# Patient Record
Sex: FEMALE | Race: OTHER | URBAN - METROPOLITAN AREA
[De-identification: names, ages, dates, MRNs, and addresses within clinical notes are randomized per-mention and may not be internally consistent; named-entity substitution may affect disease eponyms.]

---

## 2017-05-10 ENCOUNTER — OFFICE VISIT (OUTPATIENT)
Dept: INTERNAL MEDICINE CLINIC | Age: 22
End: 2017-05-10

## 2017-05-10 VITALS
OXYGEN SATURATION: 99 % | WEIGHT: 168 LBS | SYSTOLIC BLOOD PRESSURE: 132 MMHG | HEART RATE: 99 BPM | BODY MASS INDEX: 26.71 KG/M2 | TEMPERATURE: 99 F | DIASTOLIC BLOOD PRESSURE: 84 MMHG

## 2017-05-10 DIAGNOSIS — G89.29 CHRONIC BILATERAL THORACIC BACK PAIN: ICD-10-CM

## 2017-05-10 DIAGNOSIS — Z11.4 SCREENING FOR HIV (HUMAN IMMUNODEFICIENCY VIRUS): ICD-10-CM

## 2017-05-10 DIAGNOSIS — R11.0 NAUSEA: ICD-10-CM

## 2017-05-10 DIAGNOSIS — R10.13 EPIGASTRIC PAIN: ICD-10-CM

## 2017-05-10 DIAGNOSIS — M54.6 CHRONIC BILATERAL THORACIC BACK PAIN: ICD-10-CM

## 2017-05-10 DIAGNOSIS — R10.9 FLANK PAIN: Primary | ICD-10-CM

## 2017-05-10 DIAGNOSIS — F90.2 ATTENTION DEFICIT HYPERACTIVITY DISORDER (ADHD), COMBINED TYPE: ICD-10-CM

## 2017-05-10 DIAGNOSIS — F41.9 ANXIETY: ICD-10-CM

## 2017-05-10 PROCEDURE — 81002 URINALYSIS NONAUTO W/O SCOPE: CPT | Performed by: NURSE PRACTITIONER

## 2017-05-10 PROCEDURE — 99214 OFFICE O/P EST MOD 30 MIN: CPT | Performed by: NURSE PRACTITIONER

## 2017-05-10 RX ORDER — OMEGA-3S/DHA/EPA/FISH OIL/D3 300MG-1000
400 CAPSULE ORAL 2 TIMES DAILY
COMMUNITY

## 2017-05-10 RX ORDER — ACETAMINOPHEN AND CODEINE PHOSPHATE 120; 12 MG/5ML; MG/5ML
1 SOLUTION ORAL DAILY
Qty: 30 TABLET | Refills: 0
Start: 2017-05-10

## 2017-05-10 RX ORDER — ACETAMINOPHEN AND CODEINE PHOSPHATE 120; 12 MG/5ML; MG/5ML
1 SOLUTION ORAL DAILY
COMMUNITY
End: 2017-05-10 | Stop reason: ALTCHOICE

## 2017-05-10 ASSESSMENT — ENCOUNTER SYMPTOMS
ABDOMINAL PAIN: 0
NAUSEA: 1
BACK PAIN: 1

## 2017-05-11 LAB
ANION GAP SERPL CALCULATED.3IONS-SCNC: 17 MMOL/L (ref 3–16)
BUN BLDV-MCNC: 11 MG/DL (ref 7–20)
CALCIUM SERPL-MCNC: 9.8 MG/DL (ref 8.3–10.6)
CHLORIDE BLD-SCNC: 99 MMOL/L (ref 99–110)
CO2: 24 MMOL/L (ref 21–32)
CREAT SERPL-MCNC: 0.6 MG/DL (ref 0.6–1.1)
GFR AFRICAN AMERICAN: >60
GFR NON-AFRICAN AMERICAN: >60
GLUCOSE BLD-MCNC: 86 MG/DL (ref 70–99)
POTASSIUM SERPL-SCNC: 3.6 MMOL/L (ref 3.5–5.1)
SODIUM BLD-SCNC: 140 MMOL/L (ref 136–145)

## 2017-05-12 LAB — HIV-1 AND HIV-2 ANTIBODIES: NEGATIVE

## 2023-09-19 PROBLEM — R78.81 BACTEREMIA ASSOCIATED WITH INTRAVASCULAR LINE (CMS-HCC): Status: ACTIVE | Noted: 2023-09-19

## 2023-09-19 PROBLEM — H35.419 LATTICE DEGENERATION: Status: ACTIVE | Noted: 2023-09-19

## 2023-09-19 PROBLEM — K52.9 GASTROENTERITIS: Status: ACTIVE | Noted: 2023-09-19

## 2023-09-19 PROBLEM — Z97.0 EYE GLOBE PROSTHESIS: Status: ACTIVE | Noted: 2023-09-19

## 2023-09-19 PROBLEM — E86.0 DEHYDRATION: Status: ACTIVE | Noted: 2023-09-19

## 2023-09-19 PROBLEM — R50.81 NEUTROPENIC FEVER (CMS-HCC): Status: ACTIVE | Noted: 2023-09-19

## 2023-09-19 PROBLEM — G43.909 MIGRAINE HEADACHE: Status: ACTIVE | Noted: 2023-09-19

## 2023-09-19 PROBLEM — N28.1 RENAL CYST, RIGHT: Status: ACTIVE | Noted: 2023-09-19

## 2023-09-19 PROBLEM — R07.9 CHEST PAIN: Status: ACTIVE | Noted: 2023-09-19

## 2023-09-19 PROBLEM — H33.329 RETINAL HOLE: Status: ACTIVE | Noted: 2023-09-19

## 2023-09-19 PROBLEM — C92.00 ACUTE MYELOID LEUKEMIA (MULTI): Status: ACTIVE | Noted: 2023-09-19

## 2023-09-19 PROBLEM — L81.4 OTHER MELANIN HYPERPIGMENTATION: Status: ACTIVE | Noted: 2021-10-28

## 2023-09-19 PROBLEM — Z20.822 SUSPECTED COVID-19 VIRUS INFECTION: Status: ACTIVE | Noted: 2023-09-19

## 2023-09-19 PROBLEM — B37.31 VULVOVAGINAL CANDIDIASIS: Status: ACTIVE | Noted: 2023-09-19

## 2023-09-19 PROBLEM — F41.9 ANXIETY: Status: ACTIVE | Noted: 2021-04-05

## 2023-09-19 PROBLEM — C92.90: Status: ACTIVE | Noted: 2023-09-19

## 2023-09-19 PROBLEM — G89.3 CANCER ASSOCIATED PAIN: Status: ACTIVE | Noted: 2023-09-19

## 2023-09-19 PROBLEM — F90.9 ATTENTION DEFICIT HYPERACTIVITY DISORDER (ADHD): Status: ACTIVE | Noted: 2021-04-26

## 2023-09-19 PROBLEM — D69.6 LOW PLATELET COUNT (CMS-HCC): Status: ACTIVE | Noted: 2023-09-19

## 2023-09-19 PROBLEM — R11.0 CHEMOTHERAPY-INDUCED NAUSEA: Status: ACTIVE | Noted: 2023-09-19

## 2023-09-19 PROBLEM — N76.0 VAGINITIS: Status: ACTIVE | Noted: 2023-09-19

## 2023-09-19 PROBLEM — F39 MOOD DISORDER (CMS-HCC): Status: ACTIVE | Noted: 2023-09-19

## 2023-09-19 PROBLEM — K58.9 IRRITABLE BOWEL SYNDROME: Status: ACTIVE | Noted: 2023-09-19

## 2023-09-19 PROBLEM — H44.521 PHTHISIS BULBI OF RIGHT EYE: Status: ACTIVE | Noted: 2023-09-19

## 2023-09-19 PROBLEM — G89.29 CHRONIC UPPER BACK PAIN: Status: ACTIVE | Noted: 2023-09-19

## 2023-09-19 PROBLEM — M54.50 CHRONIC LOW BACK PAIN: Status: ACTIVE | Noted: 2023-09-19

## 2023-09-19 PROBLEM — R10.13 ACUTE EPIGASTRIC PAIN: Status: ACTIVE | Noted: 2023-09-19

## 2023-09-19 PROBLEM — D70.9 NEUTROPENIC FEVER (CMS-HCC): Status: ACTIVE | Noted: 2023-09-19

## 2023-09-19 PROBLEM — J34.1 CYST OF MAXILLARY SINUS: Status: ACTIVE | Noted: 2023-09-19

## 2023-09-19 PROBLEM — T45.1X5A CHEMOTHERAPY-INDUCED NAUSEA: Status: ACTIVE | Noted: 2023-09-19

## 2023-09-19 PROBLEM — D22.5 MELANOCYTIC NEVI OF TRUNK: Status: ACTIVE | Noted: 2021-10-28

## 2023-09-19 PROBLEM — M79.18 MUSCULOSKELETAL PAIN: Status: ACTIVE | Noted: 2023-09-19

## 2023-09-19 PROBLEM — F32.81 PREMENSTRUAL DYSPHORIC SYNDROME: Status: ACTIVE | Noted: 2021-04-26

## 2023-09-19 PROBLEM — D64.9 ANEMIA: Status: ACTIVE | Noted: 2023-09-19

## 2023-09-19 PROBLEM — R63.5 ABNORMAL WEIGHT GAIN: Status: ACTIVE | Noted: 2023-09-19

## 2023-09-19 PROBLEM — L27.0 DRUG RASH: Status: ACTIVE | Noted: 2023-09-19

## 2023-09-19 PROBLEM — K21.9 ESOPHAGEAL REFLUX: Status: ACTIVE | Noted: 2023-09-19

## 2023-09-19 PROBLEM — R35.0 URINARY FREQUENCY: Status: ACTIVE | Noted: 2023-09-19

## 2023-09-19 PROBLEM — D84.9 IMMUNOCOMPROMISED STATE (MULTI): Status: ACTIVE | Noted: 2023-09-19

## 2023-09-19 PROBLEM — A04.72 C. DIFFICILE DIARRHEA: Status: ACTIVE | Noted: 2023-09-19

## 2023-09-19 PROBLEM — I10 HYPERTENSION: Status: ACTIVE | Noted: 2023-09-19

## 2023-09-19 PROBLEM — R30.0 DYSURIA: Status: ACTIVE | Noted: 2023-09-19

## 2023-09-19 PROBLEM — N39.0 URINARY TRACT INFECTION: Status: ACTIVE | Noted: 2023-09-19

## 2023-09-19 PROBLEM — R53.83 FATIGUE: Status: ACTIVE | Noted: 2023-09-19

## 2023-09-19 PROBLEM — T82.7XXA BACTEREMIA ASSOCIATED WITH INTRAVASCULAR LINE (CMS-HCC): Status: ACTIVE | Noted: 2023-09-19

## 2023-09-19 PROBLEM — M41.9 SCOLIOSIS: Status: ACTIVE | Noted: 2023-09-19

## 2023-09-19 PROBLEM — G89.29 CHRONIC LOW BACK PAIN: Status: ACTIVE | Noted: 2023-09-19

## 2023-09-19 PROBLEM — M54.9 CHRONIC UPPER BACK PAIN: Status: ACTIVE | Noted: 2023-09-19

## 2023-09-19 PROBLEM — R94.31 PROLONGED QT INTERVAL: Status: ACTIVE | Noted: 2023-09-19

## 2023-09-19 PROBLEM — N91.2 AMENORRHEA: Status: ACTIVE | Noted: 2023-09-19

## 2023-09-19 PROBLEM — G89.3 NEOPLASM RELATED PAIN: Status: ACTIVE | Noted: 2023-09-19

## 2023-09-19 PROBLEM — F43.12 CHRONIC POST-TRAUMATIC STRESS DISORDER (PTSD): Status: ACTIVE | Noted: 2021-04-26

## 2023-09-19 PROBLEM — L73.8 OTHER SPECIFIED FOLLICULAR DISORDERS: Status: ACTIVE | Noted: 2021-10-28

## 2023-09-19 PROBLEM — K12.31 MUCOSITIS DUE TO ANTINEOPLASTIC THERAPY: Status: ACTIVE | Noted: 2023-09-19

## 2023-09-19 PROBLEM — M54.6 THORACIC BACK PAIN: Status: ACTIVE | Noted: 2023-09-19

## 2023-09-19 PROBLEM — M62.838 MUSCLE SPASM: Status: ACTIVE | Noted: 2023-09-19

## 2023-09-19 PROBLEM — L82.1 OTHER SEBORRHEIC KERATOSIS: Status: ACTIVE | Noted: 2021-10-28

## 2023-09-19 PROBLEM — R94.2 ABNORMAL PFT: Status: ACTIVE | Noted: 2023-09-19

## 2023-09-19 PROBLEM — D61.818 PANCYTOPENIA, ACQUIRED (MULTI): Status: ACTIVE | Noted: 2023-09-19

## 2023-09-19 PROBLEM — N93.9 ABNORMAL UTERINE BLEEDING: Status: ACTIVE | Noted: 2023-09-19

## 2023-09-19 RX ORDER — VIT B COMP NO.3/FOLIC/C/BIOTIN 1 MG-60 MG
1 TABLET ORAL DAILY
COMMUNITY
End: 2024-03-11 | Stop reason: SDUPTHER

## 2023-09-19 RX ORDER — TRAZODONE HYDROCHLORIDE 100 MG/1
100 TABLET ORAL
COMMUNITY
Start: 2021-09-14

## 2023-09-19 RX ORDER — CAPSAICIN 0 G/G
CREAM TOPICAL
COMMUNITY
Start: 2022-11-10

## 2023-09-19 RX ORDER — NITROFURANTOIN 25; 75 MG/1; MG/1
1 CAPSULE ORAL EVERY 12 HOURS
COMMUNITY
Start: 2022-03-28 | End: 2024-04-16 | Stop reason: ALTCHOICE

## 2023-09-19 RX ORDER — NORETHINDRONE ACETATE AND ETHINYL ESTRADIOL 1MG-20(21)
1 KIT ORAL
COMMUNITY
Start: 2022-03-28 | End: 2024-04-16 | Stop reason: ALTCHOICE

## 2023-09-19 RX ORDER — DIPHENHYDRAMINE HCL 25 MG
25 TABLET ORAL EVERY 8 HOURS PRN
COMMUNITY
Start: 2021-06-28

## 2023-09-19 RX ORDER — CLINDAMYCIN PHOSPHATE 10 UG/ML
LOTION TOPICAL
COMMUNITY
Start: 2021-10-28 | End: 2024-04-16 | Stop reason: ALTCHOICE

## 2023-09-19 RX ORDER — TIZANIDINE 2 MG/1
2 TABLET ORAL AS NEEDED
COMMUNITY
Start: 2021-08-09

## 2023-09-19 RX ORDER — TIZANIDINE 4 MG/1
2 TABLET ORAL
COMMUNITY
Start: 2020-03-18 | End: 2024-03-11 | Stop reason: SDUPTHER

## 2023-09-19 RX ORDER — NORETHINDRONE ACETATE AND ETHINYL ESTRADIOL 1MG-20(21)
1 KIT ORAL DAILY
COMMUNITY
End: 2024-04-16 | Stop reason: ALTCHOICE

## 2023-09-19 RX ORDER — PROCHLORPERAZINE 25 MG/1
25 SUPPOSITORY RECTAL
COMMUNITY

## 2023-09-19 RX ORDER — METOPROLOL SUCCINATE 50 MG/1
1 TABLET, EXTENDED RELEASE ORAL DAILY
COMMUNITY
Start: 2021-01-07

## 2023-09-19 RX ORDER — LORAZEPAM 0.5 MG/1
0.5 TABLET ORAL EVERY 6 HOURS PRN
COMMUNITY
Start: 2022-11-10 | End: 2023-10-26 | Stop reason: SDUPTHER

## 2023-09-19 RX ORDER — AMITRIPTYLINE HYDROCHLORIDE 25 MG/1
25 TABLET, FILM COATED ORAL
COMMUNITY
Start: 2023-04-26

## 2023-09-19 RX ORDER — OMEPRAZOLE 40 MG/1
40 CAPSULE, DELAYED RELEASE ORAL
COMMUNITY
Start: 2021-10-13

## 2023-09-19 RX ORDER — FLUOXETINE HYDROCHLORIDE 20 MG/1
20 CAPSULE ORAL DAILY
COMMUNITY
Start: 2021-03-16

## 2023-09-19 RX ORDER — LISDEXAMFETAMINE DIMESYLATE 40 MG/1
CAPSULE ORAL
COMMUNITY
Start: 2015-01-05

## 2023-09-19 RX ORDER — LORATADINE 10 MG/1
1 CAPSULE, LIQUID FILLED ORAL DAILY
COMMUNITY
Start: 2021-08-19

## 2023-09-19 RX ORDER — GRANISETRON HYDROCHLORIDE 1 MG/1
1 TABLET, FILM COATED ORAL EVERY 12 HOURS PRN
COMMUNITY

## 2023-09-19 RX ORDER — FLUOXETINE HYDROCHLORIDE 40 MG/1
40 CAPSULE ORAL
COMMUNITY
Start: 2023-08-10

## 2023-09-19 RX ORDER — AZACITIDINE 100 MG/1
100 INJECTION, POWDER, LYOPHILIZED, FOR SOLUTION INTRAVENOUS; SUBCUTANEOUS
COMMUNITY
Start: 2022-02-17 | End: 2024-03-11 | Stop reason: ALTCHOICE

## 2023-09-19 RX ORDER — ACYCLOVIR 400 MG/1
400 TABLET ORAL 2 TIMES DAILY
COMMUNITY
Start: 2021-09-28

## 2023-09-19 RX ORDER — LORAZEPAM 1 MG/1
1 TABLET ORAL
COMMUNITY
End: 2024-03-14 | Stop reason: SDUPTHER

## 2023-09-19 RX ORDER — NYSTATIN 100000 U/G
OINTMENT TOPICAL
COMMUNITY
Start: 2021-03-17 | End: 2024-04-16 | Stop reason: ALTCHOICE

## 2023-09-19 RX ORDER — KETOCONAZOLE 20 MG/ML
SHAMPOO, SUSPENSION TOPICAL
COMMUNITY
Start: 2021-10-28

## 2023-10-19 ENCOUNTER — APPOINTMENT (OUTPATIENT)
Dept: PEDIATRIC HEMATOLOGY/ONCOLOGY | Facility: HOSPITAL | Age: 28
End: 2023-10-19

## 2023-10-26 DIAGNOSIS — C92.01 AML (ACUTE MYELOID LEUKEMIA) IN REMISSION (MULTI): Primary | ICD-10-CM

## 2023-10-26 RX ORDER — LORAZEPAM 0.5 MG/1
0.5 TABLET ORAL EVERY 6 HOURS PRN
Qty: 20 TABLET | Refills: 0 | Status: SHIPPED | OUTPATIENT
Start: 2023-10-26 | End: 2024-01-10 | Stop reason: SDUPTHER

## 2023-10-31 ENCOUNTER — HOSPITAL ENCOUNTER (EMERGENCY)
Facility: CLINIC | Age: 28
Discharge: HOME | End: 2023-10-31
Attending: EMERGENCY MEDICINE
Payer: COMMERCIAL

## 2023-10-31 VITALS
TEMPERATURE: 100 F | RESPIRATION RATE: 19 BRPM | DIASTOLIC BLOOD PRESSURE: 83 MMHG | OXYGEN SATURATION: 97 % | HEIGHT: 67 IN | SYSTOLIC BLOOD PRESSURE: 114 MMHG | HEART RATE: 102 BPM | WEIGHT: 210 LBS | BODY MASS INDEX: 32.96 KG/M2

## 2023-10-31 DIAGNOSIS — S61.112A LACERATION OF LEFT THUMB WITHOUT FOREIGN BODY WITH DAMAGE TO NAIL, INITIAL ENCOUNTER: Primary | ICD-10-CM

## 2023-10-31 PROCEDURE — 99282 EMERGENCY DEPT VISIT SF MDM: CPT

## 2023-10-31 RX ORDER — LIDOCAINE HYDROCHLORIDE AND EPINEPHRINE 10; 10 MG/ML; UG/ML
INJECTION, SOLUTION INFILTRATION; PERINEURAL
Status: DISCONTINUED
Start: 2023-10-31 | End: 2023-10-31 | Stop reason: WASHOUT

## 2023-11-01 NOTE — ED NOTES
Hemclot applied 2x2 secured with coban . Metal finger splint given      Ritu Koenig RN  10/31/23 2047

## 2023-11-01 NOTE — DISCHARGE INSTRUCTIONS
The laceration is sensitive in nature that if we try to sew it this would just destroy the tissue. This is going to have to heal by what is called secondary intention so this will heal from the inside out. We will go ahead and put a nice dressing on here we would like to have you had your family doctor see this in 1 to 2 days and change the dressing then he will probably have you change the dressing daily. We would like you to return back to emergency center if you have fevers chills redness or pus that develops. You may take Tylenol for any discomfort that you are having.

## 2023-11-01 NOTE — ED PROVIDER NOTES
edit the dictations but occasionally words are mis-transcribed.)    Jose Hillman MD  Attending Emergency Physician            Jose Hillman MD  10/31/23 2021

## 2023-11-09 ENCOUNTER — HOSPITAL ENCOUNTER (OUTPATIENT)
Dept: PEDIATRIC HEMATOLOGY/ONCOLOGY | Facility: HOSPITAL | Age: 28
Discharge: HOME | End: 2023-11-09
Payer: COMMERCIAL

## 2023-11-09 VITALS
HEIGHT: 67 IN | RESPIRATION RATE: 18 BRPM | SYSTOLIC BLOOD PRESSURE: 124 MMHG | DIASTOLIC BLOOD PRESSURE: 78 MMHG | BODY MASS INDEX: 33.88 KG/M2 | WEIGHT: 215.83 LBS | TEMPERATURE: 99.7 F | HEART RATE: 97 BPM

## 2023-11-09 DIAGNOSIS — C92.01 ACUTE MYELOID LEUKEMIA IN REMISSION (MULTI): ICD-10-CM

## 2023-11-09 DIAGNOSIS — C92.90: Primary | ICD-10-CM

## 2023-11-09 LAB
ALBUMIN SERPL BCP-MCNC: 4.7 G/DL (ref 3.4–5)
ALP SERPL-CCNC: 45 U/L (ref 33–110)
ALT SERPL W P-5'-P-CCNC: 19 U/L (ref 7–45)
ANION GAP SERPL CALC-SCNC: 15 MMOL/L (ref 10–20)
AST SERPL W P-5'-P-CCNC: 13 U/L (ref 9–39)
BASOPHILS # BLD AUTO: 0.03 X10*3/UL (ref 0–0.1)
BASOPHILS NFR BLD AUTO: 0.4 %
BILIRUB DIRECT SERPL-MCNC: 0 MG/DL (ref 0–0.3)
BILIRUB SERPL-MCNC: 0.3 MG/DL (ref 0–1.2)
BUN SERPL-MCNC: 13 MG/DL (ref 6–23)
CALCIUM SERPL-MCNC: 9.8 MG/DL (ref 8.6–10.6)
CHLORIDE SERPL-SCNC: 101 MMOL/L (ref 98–107)
CO2 SERPL-SCNC: 24 MMOL/L (ref 21–32)
CREAT SERPL-MCNC: 0.64 MG/DL (ref 0.5–1.05)
EOSINOPHIL # BLD AUTO: 0.19 X10*3/UL (ref 0–0.7)
EOSINOPHIL NFR BLD AUTO: 2.4 %
ERYTHROCYTE [DISTWIDTH] IN BLOOD BY AUTOMATED COUNT: 13.1 % (ref 11.5–14.5)
FERRITIN SERPL-MCNC: 1273 NG/ML (ref 8–150)
GFR SERPL CREATININE-BSD FRML MDRD: >90 ML/MIN/1.73M*2
GLUCOSE SERPL-MCNC: 99 MG/DL (ref 74–99)
HCT VFR BLD AUTO: 38.3 % (ref 36–46)
HGB BLD-MCNC: 12.4 G/DL (ref 12–16)
IMM GRANULOCYTES # BLD AUTO: 0.05 X10*3/UL (ref 0–0.7)
IMM GRANULOCYTES NFR BLD AUTO: 0.6 % (ref 0–0.9)
LYMPHOCYTES # BLD AUTO: 2.41 X10*3/UL (ref 1.2–4.8)
LYMPHOCYTES NFR BLD AUTO: 30.9 %
MCH RBC QN AUTO: 27.7 PG (ref 26–34)
MCHC RBC AUTO-ENTMCNC: 32.4 G/DL (ref 32–36)
MCV RBC AUTO: 86 FL (ref 80–100)
MONOCYTES # BLD AUTO: 0.3 X10*3/UL (ref 0.1–1)
MONOCYTES NFR BLD AUTO: 3.8 %
NEUTROPHILS # BLD AUTO: 4.82 X10*3/UL (ref 1.2–7.7)
NEUTROPHILS NFR BLD AUTO: 61.9 %
NRBC BLD-RTO: 0 /100 WBCS (ref 0–0)
PHOSPHATE SERPL-MCNC: 3.5 MG/DL (ref 2.5–4.9)
PLATELET # BLD AUTO: 229 X10*3/UL (ref 150–450)
POTASSIUM SERPL-SCNC: 4.5 MMOL/L (ref 3.5–5.3)
PROT SERPL-MCNC: 6.8 G/DL (ref 6.4–8.2)
RBC # BLD AUTO: 4.48 X10*6/UL (ref 4–5.2)
SODIUM SERPL-SCNC: 135 MMOL/L (ref 136–145)
WBC # BLD AUTO: 7.8 X10*3/UL (ref 4.4–11.3)

## 2023-11-09 PROCEDURE — 84100 ASSAY OF PHOSPHORUS: CPT | Performed by: PEDIATRICS

## 2023-11-09 PROCEDURE — 82248 BILIRUBIN DIRECT: CPT | Performed by: PEDIATRICS

## 2023-11-09 PROCEDURE — 85025 COMPLETE CBC W/AUTO DIFF WBC: CPT | Performed by: PEDIATRICS

## 2023-11-09 PROCEDURE — 80053 COMPREHEN METABOLIC PANEL: CPT | Performed by: PEDIATRICS

## 2023-11-09 PROCEDURE — 36415 COLL VENOUS BLD VENIPUNCTURE: CPT | Performed by: PEDIATRICS

## 2023-11-09 PROCEDURE — 82728 ASSAY OF FERRITIN: CPT | Performed by: PEDIATRICS

## 2023-11-09 ASSESSMENT — PAIN SCALES - GENERAL: PAINLEVEL: 0-NO PAIN

## 2023-11-09 ASSESSMENT — COLUMBIA-SUICIDE SEVERITY RATING SCALE - C-SSRS
2. HAVE YOU ACTUALLY HAD ANY THOUGHTS OF KILLING YOURSELF?: NO
6. HAVE YOU EVER DONE ANYTHING, STARTED TO DO ANYTHING, OR PREPARED TO DO ANYTHING TO END YOUR LIFE?: NO
1. IN THE PAST MONTH, HAVE YOU WISHED YOU WERE DEAD OR WISHED YOU COULD GO TO SLEEP AND NOT WAKE UP?: NO

## 2023-11-09 ASSESSMENT — ENCOUNTER SYMPTOMS
DEPRESSION: 0
LOSS OF SENSATION IN FEET: 0

## 2023-11-09 NOTE — PROGRESS NOTES
Patient ID: Jaylan Palmer is a 28 y.o. female.  Referring Physician: No referring provider defined for this encounter.  Primary Care Provider: Jazmine Howell MD    Date of Service:  11/9/2023    SUBJECTIVE:    History of Present Illness:  Jaylan is here for follow up today.  She is a 29 yo with a history of AML age 2y, diagnosed and treated here at [facility] per clinical trial [id number] (dauno, Cytarabine, etopo). She developed relapse v/s denovo AML at age 25. She is s/p induction chemotherapy with FLAG-Gema and EOI BMA/Bx showed morphological remission. She has completed 1 course of HiDoseAraC+gemutuzumab (one dose), and 2  courses of HiDose Tracy-C. She is currently on maintenance chemo with oral Azacitadine which she started on 10/17/21. The azacitadine has been dose reduced to 200 mg due to infections.    At [facility], she had a ferritin checked which was elevated.  She subsequently had a MRI (not contrast) that showed liver iron concentration of 3.4 mg Fe/g.  There was also a 5.4cmX5.6 cm lesion on her left lobe of her liver noted as well.  She stopped her OCP as this might be a benign lesion. She was also seen by hepatology.  She is not having any pain by her liver and her LFTs have been normal.     There has been some discussion of phlebotomy vs chelation.  At this point, it is being monitored. She has resumed menses very recently which will hopefully help to bring down the ferritin.     In [place], she sees specialists for neurology (this was for her chemo fog after treatment), ANNMARIE, cardiology, psychiatry, and will be seeing hepatology.   She has been busy.  She got  Oct 21 to her fiancee Jhoan.  She is living on [address] since Jhoan does med school there but working in [facility].  She has ongoing symptoms of fatigue, higher temps overall runs  at times shira while on aza, and has constipation this round (usually has diarrhea and more nausea). Has been taking daily miralax.  We discussed it can be twice a day and  could add colace and senna.  She has a PCP in [place].  We discussed talking with PCP if constipation doesn't improve.  Overall, she also discussed that since being on chemotherapy, her GI tract is not regular.  Additionally, she notes, it's been difficult to lose weight.  She has been eating healthy meals and has been active unpacking/moving into [place].  She is interested in walking more too. Most recent azacitadine started Monday 10/30. She also notes that in general, when she gets viral infections they are much more severe than prior to starting chemo.     She has had 2 longer breaks from the azacitadine due to a trip to [facility] and getting .      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 She was apparently well for nearly 22 years until November of 2020 when she started complaining of multiple vague symptoms including fatigue, headaches, fainting, dizziness, generalized body pain, tiredness, bruising over the arms, petechiae, low grade fevers and heavy menses. CBC showed thrombocytopenia and peripheral circulating blasts concerning for relapse. She subsequently moved here for further treatment    Her exam showed multiple bruises over thighs, legs, arms and petechiae. 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 Lab work showed: CBC 12.9>9.6/30.9<26  ANC 3350  ALC 3230 Blast %44    UA 3.8  RFP: 139/3.7/107/23/15/0.57 Peripheral smear: circulating blasts with Agueda rods consistent with relapsed AML    BMA/Bx done which showed hypercellular marrow involved by 57% blasts consistent with AML.   Cytogenetics, FISH - negative for CBFB (16q22 rearrangement), KMT2A MLL gene rearrangement, p53, RUNX1, 7q- and 5q-  and molecular studies are unremarkable - FLT3 ITD and FLT3 TKD  negative. 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 CSF- negative for blasts    Based on the above Jaylan did not have any of the high risk features. 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 We discussed her case at the time in both Adult and Pediatric tumor boards. She was offered to go on Phase III trial with uproleselan administered with chemo vs chemo alone in relapsed/refractory AML. However after careful consideration, Jaylan declined the trial  Subsequently proceeded with idarubicin-FLAG regimen.  She had an ECHO, which showed normal EF69% normal cardiac function.  She has prior cum anthracycline dose of 340 mg/m2.  Dose reduced cecy from 10mg/m2 to 8mg/m2 and administered zinecard. She received her first course chemotherapy and tolerated it fairly well. No significant tumor lysis. Cleared peripheral blasts early by day 3 of chemo. Fairly uncomplicated course except for Enterococcus Faecalis bacteremia and subsequent line removal.   End of induction BMA/ BX done on 4/23 showed hypocellular bone marrow (40% cellular) with maturing trilineage hematopoiesis, left shifted granulopoiesis and 3% myeloblasts. The overall findings suggest a regenerating marrow. The myeloblasts do not show immunophenotypic atypia seen in the patient's leukemic blasts (aberrant CD7 expression) .  Of note, after obtaining her NGS panel results we reviewed her previous reports her initial disease at age 2 was negative for CEBPA and it was tested as she was part of the COG trial. We performed a skin bx to assess for germline CEBPA and results are pending.  Her unrelated bone marrow donor search has shown a suitable match yet. Mom is age 65 and Dad is 78, mom 6/12 dad 8/12 match. One of her half sisters (Rosaura age 46y)  is 7/12 match. Jaylan declined HSCT after discussion with BMT team regarding TRM.  They had also seeked outside  "opinions which also supported chemotherapy rather than HSCT.    5/5/21 - 5/7/21: HD ARAC, Neupogen 5/9-5/18 6/2/21- admission for HD ARAC/ GO    7/19/21: Admit for Cycle 3 HD ARAC (last cycle of chemo), discharged home on 8/9.     10/15/21: started maintenance chemotherapy with oral Azacitidine          Acute myeloid leukemia (CMS/HCC)   9/19/2023 Initial Diagnosis    Acute myeloid leukemia (CMS/HCC)           Review of Systems   Constitutional:  Positive for fatigue and fever. Negative for appetite change.        Runs higher temps in general on aza, has fatigue, overall difficulty with weight loss due to chemo   HENT:   Negative for nosebleeds, sore throat and trouble swallowing.    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    Cardiovascular:  Negative for chest pain and palpitations.   Gastrointestinal:  Positive for abdominal distention and constipation.   Endocrine: Negative.    Genitourinary:  Negative for difficulty urinating, dysuria and hematuria.    Musculoskeletal: Negative.    Skin: Negative.    Neurological: Negative.         Some headaches but not severe and manageable   Hematological: Negative.         Does get swollen cervical nodes with aza.  They are actually decreasing in size right now   Psychiatric/Behavioral: Negative.          No new changes from baseline.  On medication for sleep and anxiety       OBJECTIVE:    VS:  /78 (BP Location: Right arm, Patient Position: Sitting, BP Cuff Size: Adult)   Pulse 97   Temp 37.6 °C (99.7 °F) (Oral)   Resp 18   Ht 1.691 m (5' 6.58\")   Wt 97.9 kg (215 lb 13.3 oz)   BMI 34.24 kg/m²   BSA: 2.14 meters squared  Pain: 0      Physical Exam  Constitutional:       Appearance: Normal appearance.   HENT:      Head: Normocephalic and atraumatic.      Right Ear: External ear normal.      Left Ear: External ear normal.      Nose: Nose normal.      " Mouth/Throat:      Mouth: Mucous membranes are moist.      Pharynx: Oropharynx is clear.   Eyes:      Conjunctiva/sclera: Conjunctivae normal.      Comments: Prosthesis right eye   Neck:      Comments: Cervical Lns are soft and mobile under 2 cm  Cardiovascular:      Rate and Rhythm: Normal rate and regular rhythm.      Heart sounds: Normal heart sounds.   Pulmonary:      Effort: Pulmonary effort is normal.      Breath sounds: Normal breath sounds.   Abdominal:      Palpations: Abdomen is soft.      Comments: Mild distension/bloating   Musculoskeletal:         General: Normal range of motion.      Cervical back: Normal range of motion and neck supple.   Lymphadenopathy:      Cervical: Cervical adenopathy present.   Skin:     General: Skin is warm and dry.   Neurological:      General: No focal deficit present.      Mental Status: She is alert and oriented to person, place, and time. Mental status is at baseline.   Psychiatric:         Mood and Affect: Mood normal.                Laboratory:  The pertinent laboratory results were reviewed and discussed with the patient.  Hospital Outpatient Visit on 11/09/2023   Component Date Value Ref Range Status    WBC 11/09/2023 7.8  4.4 - 11.3 x10*3/uL Final    nRBC 11/09/2023 0.0  0.0 - 0.0 /100 WBCs Final    RBC 11/09/2023 4.48  4.00 - 5.20 x10*6/uL Final    Hemoglobin 11/09/2023 12.4  12.0 - 16.0 g/dL Final    Hematocrit 11/09/2023 38.3  36.0 - 46.0 % Final    MCV 11/09/2023 86  80 - 100 fL Final    MCH 11/09/2023 27.7  26.0 - 34.0 pg Final    MCHC 11/09/2023 32.4  32.0 - 36.0 g/dL Final    RDW 11/09/2023 13.1  11.5 - 14.5 % Final    Platelets 11/09/2023 229  150 - 450 x10*3/uL Final    Neutrophils % 11/09/2023 61.9  40.0 - 80.0 % Final    Immature Granulocytes %, Automated 11/09/2023 0.6  0.0 - 0.9 % Final    Immature Granulocyte Count (IG) includes promyelocytes, myelocytes and metamyelocytes but does not include bands. Percent differential counts (%) should be interpreted  in the context of the absolute cell counts (cells/UL).    Lymphocytes % 11/09/2023 30.9  13.0 - 44.0 % Final    Monocytes % 11/09/2023 3.8  2.0 - 10.0 % Final    Eosinophils % 11/09/2023 2.4  0.0 - 6.0 % Final    Basophils % 11/09/2023 0.4  0.0 - 2.0 % Final    Neutrophils Absolute 11/09/2023 4.82  1.20 - 7.70 x10*3/uL Final    Percent differential counts (%) should be interpreted in the context of the absolute cell counts (cells/uL).    Immature Granulocytes Absolute, Au* 11/09/2023 0.05  0.00 - 0.70 x10*3/uL Final    Lymphocytes Absolute 11/09/2023 2.41  1.20 - 4.80 x10*3/uL Final    Monocytes Absolute 11/09/2023 0.30  0.10 - 1.00 x10*3/uL Final    Eosinophils Absolute 11/09/2023 0.19  0.00 - 0.70 x10*3/uL Final    Basophils Absolute 11/09/2023 0.03  0.00 - 0.10 x10*3/uL Final    Albumin 11/09/2023 4.7  3.4 - 5.0 g/dL Final    Bilirubin, Total 11/09/2023 0.3  0.0 - 1.2 mg/dL Final    Bilirubin, Direct 11/09/2023 0.0  0.0 - 0.3 mg/dL Final    Alkaline Phosphatase 11/09/2023 45  33 - 110 U/L Final    ALT 11/09/2023 19  7 - 45 U/L Final    Patients treated with Sulfasalazine may generate falsely decreased results for ALT.    AST 11/09/2023 13  9 - 39 U/L Final    Total Protein 11/09/2023 6.8  6.4 - 8.2 g/dL Final    Ferritin 11/09/2023 1,273 (H)  8 - 150 ng/mL Final    Glucose 11/09/2023 99  74 - 99 mg/dL Final    Sodium 11/09/2023 135 (L)  136 - 145 mmol/L Final    Potassium 11/09/2023 4.5  3.5 - 5.3 mmol/L Final    Chloride 11/09/2023 101  98 - 107 mmol/L Final    Bicarbonate 11/09/2023 24  21 - 32 mmol/L Final    Anion Gap 11/09/2023 15  10 - 20 mmol/L Final    Urea Nitrogen 11/09/2023 13  6 - 23 mg/dL Final    Creatinine 11/09/2023 0.64  0.50 - 1.05 mg/dL Final    eGFR 11/09/2023 >90  >60 mL/min/1.73m*2 Final    Calculations of estimated GFR are performed using the 2021 CKD-EPI Study Refit equation without the race variable for the IDMS-Traceable creatinine  methods.  https://jasn.asnjournals.org/content/early/2021/09/22/ASN.3243405724    Calcium 11/09/2023 9.8  8.6 - 10.6 mg/dL Final    Phosphorus 11/09/2023 3.5  2.5 - 4.9 mg/dL Final    The performance characteristics of phosphorus testing in heparinized plasma have been validated by the individual  laboratory site where testing is performed. Testing on heparinized plasma is not approved by the FDA; however, such approval is not necessary.          ASSESSMENT and PLAN:    Assessment:    Jaylan, 27 yo with PMH of AML at age 2,with history of denovo v/s relapsed AML with favorable cytogenetics (FLT3 ITD/TKD negative, biallelic CEBPA and GATA2 mutation). She is s/p cycle 1 of FLAG-HIRO (3/27), and bone marrow biopsy on 4/23 at the end of cycle 1 of chemotherapy shows morphological remission and no evidence of MRD based on flow. She is now s/p 3 cycles of consolidation chemo with HD ARAC (one cycle included one dose of gemtuxumab).  She is currently on maintenance chemotherapy with oral Azacitadine. Jaylan is here today with mom for a count check and follow up.    Jaylan is well appearing today. Oral Azacitadine chemotherapy continues to be managed by adult oncology.  She is managing nausea with  Kytril. ativan and compazine.  She has a normal cbc today. She has been worked up at  for elevated ferritin and mild increase LIC on liver MRI as well as new liver lesion seen on imaging not tender, LFTs normal. She is off of the OCPs due to the liver lesion.       Plan:    Oncology:   - 28 year old female with relapsed AML s/p cycle 1 FLAG-HIRO in March 2021 and x1 cycle of HD-ARAC and 1x HD Arac + GO. Bone marrow biopsy on 4/23 showed morpholoical remission and no evidence of MRD based on flow. Jaylan is now s/p 3 cycles of Consolidation chemo with HD ARAC (3gm/m2/dose).   - Did not undergo BMT given higher risk for morbidity due to underlying medical issues (please refer to note from Dr Rodríguez BMT physician)  -Jaylan had  Bone marrow bx and aspirate done on 8/13. Results of BMBX were negative for MRD  -Jaylan spoke with Dr. Baum in adult Heme/Onc re: vaccine trial. Not eligible for trial currently.  - On oral Azacitidine for maintenance chemotherapy.  Dose reduced to 200 mg.     Pulmonary:   Previously had reduced PFT results, currently arranging f/u appointment with Pulmonary Service. Repeat PFT scheduled for 10/4, met with Pulmonology. Improved DLCO currently considered in the normal range.    Cardiac:   - Follows with onco-cardiology at .  Continue metoprolol.    Neuro:  neuropathy-we prescribed capsaicin at list visit but she hasn't used it yet.   Has neurologist at   Headaches: - Chemotherapy induced headaches. Neurology has prescribed Amitriptyline 25mg every night. PRN Benadryl for migraine cocktail.   FENGI:  -Jaylan has a history of multiple GI complaints/ IBS. Peds GI recommended Dr. Narinder Zee/ Dr. Maicol Titus on the adult side, shared this with Jaylan. Is having constipation this cycle.  Will increase miralax and can also use senna/colace.    - Kytril working to control nausea, scripts sent for ativan and compazine today  -s/p  MRI of liver 7/13 and saw hepatology at   on 7/11. She will continue to follow with that team.          ID:  -No longer requires PCP ppx  -Immunized in fall 2023 for COVID booster and flu  -continue prophylactic acyclovir.     Reproductive Endo:  Was seen by gyn March 2022, seen in [place] on 8/22  -recent AMH in normal range at   -hasn't resumed regular menses yet  -using condoms for protection while on chemo  -Jaylan will talk to gyn considering copper IUD    Immune:    Saw immunology at         RTC: Will come back for follow up with us in 3 mos for exam, count check.      Jazmine Howell MD

## 2023-11-10 ASSESSMENT — ENCOUNTER SYMPTOMS
MUSCULOSKELETAL NEGATIVE: 1
HEMATURIA: 0
DYSURIA: 0
HEMATOLOGIC/LYMPHATIC NEGATIVE: 1
ENDOCRINE NEGATIVE: 1
ABDOMINAL DISTENTION: 1
FEVER: 1
WHEEZING: 0
APPETITE CHANGE: 0
FATIGUE: 1
TROUBLE SWALLOWING: 0
SORE THROAT: 0
CONSTIPATION: 1
COUGH: 0
PALPITATIONS: 0
DIFFICULTY URINATING: 0
NEUROLOGICAL NEGATIVE: 1
SHORTNESS OF BREATH: 0
PSYCHIATRIC NEGATIVE: 1

## 2024-01-10 DIAGNOSIS — C92.01 AML (ACUTE MYELOID LEUKEMIA) IN REMISSION (MULTI): ICD-10-CM

## 2024-01-10 RX ORDER — LORAZEPAM 0.5 MG/1
0.5 TABLET ORAL EVERY 6 HOURS PRN
Qty: 20 TABLET | Refills: 0 | Status: SHIPPED | OUTPATIENT
Start: 2024-01-10 | End: 2024-04-16 | Stop reason: SDUPTHER

## 2024-01-24 ENCOUNTER — E-VISIT (OUTPATIENT)
Dept: HEMATOLOGY/ONCOLOGY | Facility: HOSPITAL | Age: 29
End: 2024-01-24
Payer: COMMERCIAL

## 2024-01-31 NOTE — PROGRESS NOTES
Spoke to patient and explained that Dr. Baum is assigned as one of her providers. Pt states she will call the appointment line to schedule Patient verbalized understanding and agreement regarding discussed information via verbal feedback.

## 2024-01-31 NOTE — TELEPHONE ENCOUNTER
----- Message from Joanna Zhao sent at 1/31/2024 10:04 AM EST -----  Regarding: FW: also  Contact: [phone]    ----- Message -----  From: Jaylan Palmer  Sent: 1/31/2024   9:58 AM EST  To: [facility] Rn Care Coordinator  Subject: also                                             Hi I’m trying to book an appointment on March 6th or 8th but you do not pull up as my provider so it will not let me access scheduling requests.

## 2024-02-08 ENCOUNTER — NURSE TRIAGE (OUTPATIENT)
Dept: ADMISSION | Facility: HOSPITAL | Age: 29
End: 2024-02-08
Payer: COMMERCIAL

## 2024-02-08 NOTE — TELEPHONE ENCOUNTER
Call placed to patient and advised her to follow up with PCP., per JOSSELYN Oscar NP. Patient states she is expecting a call.   Advised patient to follow up with Dr. Baum regarding how she is feeling prior to the start of chemotherapy next week.   Patient is able to verbalize understanding.

## 2024-02-08 NOTE — TELEPHONE ENCOUNTER
Patient is calling in regarding ongoing jaw/ear pain and  fever. Patient reports she finished her 2nd round of antibiotics (Levaquin) yesterday.   Fever today is 100.8.   Patient denies SUTHERLAND, SOB, CP, weakness. Patient reports eating, drinking ,and urinating normally.   Patient reports recent episodes of diarrhea. Patient reports headache, right jaw and ear pain rated as a 2/10.   Patient has calls out to her PCP and Onco/PCP, but is concerned because she is due to start  chemotherapy on 02/12/24.   Patient states she is scheduled for a CT scan but not until 03/06/24.  Secure chat to the team.   Additional Information   Commented on: Are you having any of these problems?     Right side jaw and ear pain. Rates as 2/10   Commented on: Headache pain - on a scale of 0 - 10 (0 being no pain and 10 being the worst possible) how would you rate your pain?     Jaw and ear pain    Protocols used: Fever/Chills/Infection

## 2024-02-12 ENCOUNTER — TELEPHONE (OUTPATIENT)
Dept: ADMISSION | Facility: HOSPITAL | Age: 29
End: 2024-02-12
Payer: COMMERCIAL

## 2024-02-12 NOTE — TELEPHONE ENCOUNTER
Attempted to call pt - left  requesting return call for further details on ongoing symptoms.   Last FUV 2/1 with next planned FUV 3/11

## 2024-02-12 NOTE — TELEPHONE ENCOUNTER
----- Message from Joanna Zhao sent at 2/12/2024 11:08 AM EST -----  Regarding: FW: Fever/ Jaw   Contact: [phone]    ----- Message -----  From: Jaylan Palmer  Sent: 2/12/2024   9:56 AM EST  To: [facility] Rn Care Coordinator  Subject: Fever/ Jaw                                       Hi- an update I got my CT in [place] and am now back in [place]. My CT was clear except for my left sinus which they saw in 2020 (right before my cancer Dx) was full and thought was a mucus retention cyst. Here is message from my [facility] PCP “Per our phone conversation 2/10/23, I doubt the CT maxillary findings are related to current sxs, but have messaged your ENT here at , Dr. Prather, to get his opinion.    Most concerning thing is to help determine cause of persistent fevers, which I think are unrelated to your jaw pain (given dentist thinks it's your TMJ, the pain has improved and fevers have persisted, and CT was normal on that side).  It sounds like temp <100.4 on 2/10 and 2/11/24.  I would still rec repeating CBC w/diff and home covid swab (low liklihood but easy to rule out).  If fevers (Temp >100.4) return, we need to start thinking both outside the box (could this be a drug fever from aza, something autoimmune, something related to a clot) vs expanding our infectious work-up.  I would want to do this in coordiation with your PCP in [place] and oncology. Let me know what your temp is today and we can decide next steps”      Negative home Covid test. However, this morning I woke up with definite head congestion and running nose. Makes me wonder if my body has been fighting something off. Last night my fever finally hit 98.9 (I have not been this low in 3 weeks!). This morning I am at 99.8. I am going to go today for my follow up CBC and dif.

## 2024-02-14 NOTE — TELEPHONE ENCOUNTER
I spoke with pt this morning.   She said she is still having fevers of 100.5 for the past 2 days.   She is having ongoing cold symptoms- negative covid.   She is mostly concerned with CT results showing complete opacification of left sinus- which is similar to scan in 2020 when she was diagnosed with AML. Pt unsure if she needs biopsy of the sinus or what plan needs to be.   She is trying to manage PCP in [place] (who is oncology as well), pediatric oncologist she still follows with, Dr Baum,  and new oncologist in [place] - so all providers have same info and can discuss plan of care.   I advised that Dr. Baum will be reaching out later today- she is grateful and will await a call.

## 2024-03-05 ENCOUNTER — DOCUMENTATION (OUTPATIENT)
Dept: HEMATOLOGY/ONCOLOGY | Facility: HOSPITAL | Age: 29
End: 2024-03-05
Payer: COMMERCIAL

## 2024-03-05 DIAGNOSIS — R50.9 RECURRENT FEVER OF UNKNOWN CAUSE: Primary | ICD-10-CM

## 2024-03-05 NOTE — PROGRESS NOTES
Email exchange with patient via her [facility] email:    My reply:     So frustrating!  I'm sorry this is happening, and all the more difficult with all the transitions.       with the recurring fever, any new or worsening other symptoms?  If there are any new upper respiratory symptoms such cough, runny nose, or headache, I would recommend getting swabbed again for flu and covid (I suspect the others will agree with me - these two bugs have just been EVERYWHERE recently.)    I love the fact that with 's shiny new EMR, I can read Dr. Arango's documentation from the [facility].  I can see they tested: BLOOD cx x 2, UA+ cx, serum histoplasma antigen + histo antibodies, fungitell, galactomannan, strongyloides antibodies, RPP, C diff, CMV PCR and GI panel.  Unfortunately, I am unable to review the results - I suspect some is still pending?  I note that he is suspicious for a slow to clear viral infection. This is plausible, just wish we could identify which one.  When will you be in [place]?  If you are unable to secure an ENT appt in [place], I can try and reach out to our colleagues here and see if we can get one for when you are here, but with us not finding anything else, that left maxillary sinus is what I would guess is the next thing to investigate in detail.   Unfortunately, I think we have to continue to HOLD the azacitidine for now - we need this to resolve before we consider restarting.               From: Jaylan Palmer (fareed) <[email or web address]>  Sent: Tuesday, March 5, 2024 12:52:22 PM  To: Virginie Wood (noémj) <[email or web address]>; Jazmine Howell <[email or web address]>; Eron Baum <[email or web address]>  Subject: Encrypt: [place] Update     Hi all-   I just wanted to touch base and give some updates since I will be seeing Dr. Howell Thursday and Dr. Baum Monday.     I am switching primary care providers in [place] and my new provider  appointment is the 13th. I have still have the fever however yesterday it was around 101.6 all day (higher than has ever been) it could be a new infection or something else going on. However today has been back around 100.6.     So far all CT scans and Labs seem normal. I went to infectious disease and so far everything they are looking for has come back negative. I have copies of all results with me and in my chart. I have not been able to see ENT yet but am working to see if I can in [place] or call somewhere in [place]?  I have an ECHO scheduled 3/21.     Let me know if there is anything else you think I need to do for a work up while I am in [place].       Thank you!         Jaylan Palmer, PhD, MPH     Research      Survivorship and Supportive Services    [facility] [place]     Email: [email or web address]    Call/Text: [phone]

## 2024-03-07 ENCOUNTER — HOSPITAL ENCOUNTER (OUTPATIENT)
Dept: PEDIATRIC HEMATOLOGY/ONCOLOGY | Facility: HOSPITAL | Age: 29
Discharge: HOME | End: 2024-03-07
Payer: COMMERCIAL

## 2024-03-07 VITALS
BODY MASS INDEX: 34.86 KG/M2 | DIASTOLIC BLOOD PRESSURE: 89 MMHG | HEART RATE: 97 BPM | RESPIRATION RATE: 19 BRPM | TEMPERATURE: 99.6 F | SYSTOLIC BLOOD PRESSURE: 130 MMHG | WEIGHT: 216.93 LBS | HEIGHT: 66 IN

## 2024-03-07 DIAGNOSIS — R50.81 NEUTROPENIC FEVER (CMS-HCC): ICD-10-CM

## 2024-03-07 DIAGNOSIS — D70.9 NEUTROPENIC FEVER (CMS-HCC): ICD-10-CM

## 2024-03-07 DIAGNOSIS — R50.9 RECURRENT FEVER OF UNKNOWN CAUSE: Primary | ICD-10-CM

## 2024-03-07 DIAGNOSIS — C92.01 ACUTE MYELOID LEUKEMIA IN REMISSION (MULTI): ICD-10-CM

## 2024-03-07 DIAGNOSIS — D84.9 IMMUNOCOMPROMISED STATE (MULTI): ICD-10-CM

## 2024-03-07 DIAGNOSIS — R35.0 URINARY FREQUENCY: ICD-10-CM

## 2024-03-07 DIAGNOSIS — C92.90: Primary | ICD-10-CM

## 2024-03-07 DIAGNOSIS — C92.90: ICD-10-CM

## 2024-03-07 DIAGNOSIS — R50.9 FEVER OF UNKNOWN ORIGIN: ICD-10-CM

## 2024-03-07 LAB
ALBUMIN SERPL BCP-MCNC: 4.8 G/DL (ref 3.4–5)
ALP SERPL-CCNC: 40 U/L (ref 33–110)
ALT SERPL W P-5'-P-CCNC: 39 U/L (ref 7–45)
ANION GAP SERPL CALC-SCNC: 16 MMOL/L (ref 10–20)
APPEARANCE UR: CLEAR
AST SERPL W P-5'-P-CCNC: 20 U/L (ref 9–39)
BASOPHILS # BLD AUTO: 0.02 X10*3/UL (ref 0–0.1)
BASOPHILS NFR BLD AUTO: 0.3 %
BILIRUB DIRECT SERPL-MCNC: 0 MG/DL (ref 0–0.3)
BILIRUB SERPL-MCNC: 0.4 MG/DL (ref 0–1.2)
BILIRUB UR STRIP.AUTO-MCNC: NEGATIVE MG/DL
BUN SERPL-MCNC: 11 MG/DL (ref 6–23)
CALCIUM SERPL-MCNC: 10.3 MG/DL (ref 8.6–10.6)
CHLORIDE SERPL-SCNC: 104 MMOL/L (ref 98–107)
CO2 SERPL-SCNC: 21 MMOL/L (ref 21–32)
COLOR UR: NORMAL
CREAT SERPL-MCNC: 0.66 MG/DL (ref 0.5–1.05)
CRP SERPL-MCNC: 0.49 MG/DL
EGFRCR SERPLBLD CKD-EPI 2021: >90 ML/MIN/1.73M*2
EOSINOPHIL # BLD AUTO: 0.13 X10*3/UL (ref 0–0.7)
EOSINOPHIL NFR BLD AUTO: 1.6 %
ERYTHROCYTE [DISTWIDTH] IN BLOOD BY AUTOMATED COUNT: 13.2 % (ref 11.5–14.5)
ERYTHROCYTE [SEDIMENTATION RATE] IN BLOOD BY WESTERGREN METHOD: 22 MM/H (ref 0–20)
FERRITIN SERPL-MCNC: 1149 NG/ML (ref 8–150)
FLUAV RNA RESP QL NAA+PROBE: NOT DETECTED
FLUBV RNA RESP QL NAA+PROBE: NOT DETECTED
GLUCOSE SERPL-MCNC: 86 MG/DL (ref 74–99)
GLUCOSE UR STRIP.AUTO-MCNC: NORMAL MG/DL
HCT VFR BLD AUTO: 36.6 % (ref 36–46)
HGB BLD-MCNC: 12 G/DL (ref 12–16)
IGA SERPL-MCNC: 62 MG/DL (ref 70–400)
IGG SERPL-MCNC: 589 MG/DL (ref 700–1600)
IGM SERPL-MCNC: 42 MG/DL (ref 40–230)
IMM GRANULOCYTES # BLD AUTO: 0.05 X10*3/UL (ref 0–0.7)
IMM GRANULOCYTES NFR BLD AUTO: 0.6 % (ref 0–0.9)
KETONES UR STRIP.AUTO-MCNC: NEGATIVE MG/DL
LEUKOCYTE ESTERASE UR QL STRIP.AUTO: NEGATIVE
LYMPHOCYTES # BLD AUTO: 2.32 X10*3/UL (ref 1.2–4.8)
LYMPHOCYTES NFR BLD AUTO: 29.3 %
MCH RBC QN AUTO: 27.5 PG (ref 26–34)
MCHC RBC AUTO-ENTMCNC: 32.8 G/DL (ref 32–36)
MCV RBC AUTO: 84 FL (ref 80–100)
MONOCYTES # BLD AUTO: 0.4 X10*3/UL (ref 0.1–1)
MONOCYTES NFR BLD AUTO: 5 %
NEUTROPHILS # BLD AUTO: 5.01 X10*3/UL (ref 1.2–7.7)
NEUTROPHILS NFR BLD AUTO: 63.2 %
NITRITE UR QL STRIP.AUTO: NEGATIVE
NRBC BLD-RTO: 0 /100 WBCS (ref 0–0)
PH UR STRIP.AUTO: 6 [PH]
PHOSPHATE SERPL-MCNC: 3.8 MG/DL (ref 2.5–4.9)
PLATELET # BLD AUTO: 230 X10*3/UL (ref 150–450)
POTASSIUM SERPL-SCNC: 4.1 MMOL/L (ref 3.5–5.3)
PROT SERPL-MCNC: 7.2 G/DL (ref 6.4–8.2)
PROT UR STRIP.AUTO-MCNC: NEGATIVE MG/DL
RBC # BLD AUTO: 4.36 X10*6/UL (ref 4–5.2)
RBC # UR STRIP.AUTO: NEGATIVE /UL
S PYO DNA THROAT QL NAA+PROBE: NOT DETECTED
SARS-COV-2 RNA RESP QL NAA+PROBE: NOT DETECTED
SODIUM SERPL-SCNC: 137 MMOL/L (ref 136–145)
SP GR UR STRIP.AUTO: 1.01
UROBILINOGEN UR STRIP.AUTO-MCNC: NORMAL MG/DL
WBC # BLD AUTO: 7.9 X10*3/UL (ref 4.4–11.3)

## 2024-03-07 PROCEDURE — 36415 COLL VENOUS BLD VENIPUNCTURE: CPT | Performed by: PEDIATRICS

## 2024-03-07 PROCEDURE — 81003 URINALYSIS AUTO W/O SCOPE: CPT | Performed by: PEDIATRICS

## 2024-03-07 PROCEDURE — 86038 ANTINUCLEAR ANTIBODIES: CPT | Performed by: PEDIATRICS

## 2024-03-07 PROCEDURE — 85025 COMPLETE CBC W/AUTO DIFF WBC: CPT | Performed by: PEDIATRICS

## 2024-03-07 PROCEDURE — 86628 CANDIDA ANTIBODY: CPT | Performed by: PEDIATRICS

## 2024-03-07 PROCEDURE — 82728 ASSAY OF FERRITIN: CPT | Performed by: PEDIATRICS

## 2024-03-07 PROCEDURE — 82784 ASSAY IGA/IGD/IGG/IGM EACH: CPT | Performed by: PEDIATRICS

## 2024-03-07 PROCEDURE — 82248 BILIRUBIN DIRECT: CPT | Performed by: PEDIATRICS

## 2024-03-07 PROCEDURE — 87632 RESP VIRUS 6-11 TARGETS: CPT | Performed by: NURSE PRACTITIONER

## 2024-03-07 PROCEDURE — 99215 OFFICE O/P EST HI 40 MIN: CPT | Performed by: PEDIATRICS

## 2024-03-07 PROCEDURE — 87799 DETECT AGENT NOS DNA QUANT: CPT | Performed by: PEDIATRICS

## 2024-03-07 PROCEDURE — 87651 STREP A DNA AMP PROBE: CPT | Performed by: PEDIATRICS

## 2024-03-07 PROCEDURE — 86140 C-REACTIVE PROTEIN: CPT | Performed by: PEDIATRICS

## 2024-03-07 PROCEDURE — 84100 ASSAY OF PHOSPHORUS: CPT | Performed by: PEDIATRICS

## 2024-03-07 PROCEDURE — 85652 RBC SED RATE AUTOMATED: CPT | Performed by: PEDIATRICS

## 2024-03-07 PROCEDURE — 87636 SARSCOV2 & INF A&B AMP PRB: CPT | Mod: 59 | Performed by: NURSE PRACTITIONER

## 2024-03-07 PROCEDURE — 80053 COMPREHEN METABOLIC PANEL: CPT | Performed by: PEDIATRICS

## 2024-03-07 ASSESSMENT — PAIN SCALES - GENERAL: PAINLEVEL: 4

## 2024-03-07 ASSESSMENT — ENCOUNTER SYMPTOMS
NEUROLOGICAL NEGATIVE: 1
FATIGUE: 1
DEPRESSION: 0
TROUBLE SWALLOWING: 0
COUGH: 0
APPETITE CHANGE: 0
PALPITATIONS: 0
DYSURIA: 0
LOSS OF SENSATION IN FEET: 0
FEVER: 1
WHEEZING: 0
SHORTNESS OF BREATH: 0
SORE THROAT: 0
OCCASIONAL FEELINGS OF UNSTEADINESS: 0
ENDOCRINE NEGATIVE: 1
HEMATURIA: 0
PSYCHIATRIC NEGATIVE: 1
DIFFICULTY URINATING: 0
HEMATOLOGIC/LYMPHATIC NEGATIVE: 1

## 2024-03-07 NOTE — PROGRESS NOTES
Patient ID: Jaylan Palmer is a 28 y.o. female.  Referring Physician: No referring provider defined for this encounter.  Primary Care Provider: Jazmine Howell MD    Date of Service:  3/7/2024    SUBJECTIVE:    History of Present Illness:  Jaylan is here for follow up today.  She is a 29 yo with a history of AML age 2y, diagnosed and treated here at [facility] per clinical trial [id number] (dauno, Cytarabine, etopo). She developed relapse v/s denovo AML at age 25. She is s/p induction chemotherapy with FLAG-Gema and EOI BMA/Bx showed morphological remission. She has completed 1 course of HiDoseAraC+gemutuzumab (one dose), and 2  courses of HiDose Tracy-C. She is currently on maintenance chemo with oral Azacitadine which she started on 10/17/21. The azacitadine has been dose reduced to 200 mg due to infections.    However, around the later part of January 2024, she started having acute ride sided jaw/face pain/inflammation issues which she was on her azacitadine.  Azacitadine was held and has been held since then due to  persistent low grade fevers. She was seen by her PCP and dentist.  She had a CBC, ESR, Crp which was reassuring and was treated empirically with a 10 days course of augmentin by  her PCP in [place] without much improvement, followed by a 5 day course of levaquin-which she did have some improvement.  She has a history of TMJ for which she wears a mouth guard.   Dental also made some adjustments to her mouth guard and has also been taking  a muscle relaxer.  The jaw pain improved but she has had persistent temps 99s-101 range.    In this time she has also had URI symptoms due to a known exposure.  She had a neck CT with contrast in 2/9/23 which was essentially normal, apart from the same Left-sided maxillary sinus opacifacation as seen on prior scans in 2021.   Her [facility] PCP Dr Wood reached out to ENT in [facility] to get their thoughts. He felt her sxs were likely unrelated to these CT findings.  However her cyst  was larger than prior and so it's possible there's something going on with the sinsuses.  Jaylan waent back to [place] as that's where she is currently living with her  Jhoan.     During this time, it was recommended she see ENT in [place], keep a fever diary (Jaylan put together a diary which shows temps in the  range. Also had CT chest/abdomen/pelvis 2/21/24 on  which showed: no mediastinal, axillary lymphadenopathy.  Lungs clear, benign focal fatty infiltration of the liver adjacent to the falciform ligament, cholelithiasis without evidence for cholecystitis, no pancreatic mass, normal sized spleen, at least 4 nonobstructing stones in the right renal collecting system, largest is at the lower pole measures 0.4 cm.  Small right lower pole cysts.  No para-aortic adenopathy.  Normal bowel loops and appendix,.  1.8 cm right ovarian follicule.,  No pelvic or inguinal adenopathy.     She has also had other work up: CMP, HIV, YNES RF, monospot, CRP, all in a normal range  Normal CXR. She has had several CBCs with normal WBC, HB, no nrbcs or blasts, normal platelets, slight increase in immature granulocytes. Urine analysis did not show sign of infection. Also had aspergillus and histo testing, Quantiferon testing, monospot, pregnancy all negative. Ferritin improved at 949.     She has had some recent RUQ discomfort and off and on rib discomfort. Also feels like neck/mandible area is a little swollen.     She had a yeast infection in her  area and also around her umbilcal area.         Recent history: At [facility], she had a ferritin checked which was elevated.  She subsequently had a MRI (not contrast) that showed liver iron concentration of 3.4 mg Fe/g.  There was also a 7 cm esion on her left lobe of her liver noted as well.  She stopped her OCP as this might be a benign lesion. She was also seen by hepatology.  Thought to be focal nodular hyperplasia    There has been some discussion of phlebotomy vs chelation.   At this point, it is being monitored. She has resumed menses very recently which will hopefully help to bring down the ferritin.     In [place], she sees specialists for neurology (this was for her chemo fog after treatment), ANNMARIE, cardiology, psychiatry, and hepatology.     SH:  She got  Oct 21 to her fiancee Jhoan.  She is living on [address] since Jhoan does med school there but working in [employer] [facility].        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 She was apparently well for nearly 22 years until November of 2020 when she started complaining of multiple vague symptoms including fatigue, headaches, fainting, dizziness, generalized body pain, tiredness, bruising over the arms, petechiae, low grade fevers and heavy menses. CBC showed thrombocytopenia and peripheral circulating blasts concerning for relapse. She subsequently moved here for further treatment    Her exam showed multiple bruises over thighs, legs, arms and petechiae. 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 Lab work showed: CBC 12.9>9.6/30.9<26  ANC 3350  ALC 3230 Blast %44    UA 3.8  RFP: 139/3.7/107/23/15/0.57 Peripheral smear: circulating blasts with Agueda rods consistent with relapsed AML    BMA/Bx done which showed hypercellular marrow involved by 57% blasts consistent with AML.   Cytogenetics, FISH - negative for CBFB (16q22 rearrangement), KMT2A MLL gene rearrangement, p53, RUNX1, 7q- and 5q-  and molecular studies are unremarkable - FLT3 ITD and FLT3 TKD negative. 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 CSF- negative for blasts    Based on the above Jaylan did not have any of the high risk features. Given her disease recurred 22 years after her initial diagnosis, it was likely  very late relapse AML v/s denovo disease. We discussed her case at the time in both Adult and Pediatric tumor boards. She was offered to go on Phase III trial with uproleselan administered with chemo vs chemo alone in relapsed/refractory AML. However after careful consideration, Jaylan declined the trial  Subsequently proceeded with idarubicin-FLAG regimen.  She had an ECHO, which showed normal EF69% normal cardiac function.  She has prior cum anthracycline dose of 340 mg/m2.  Dose reduced cecy from 10mg/m2 to 8mg/m2 and administered zinecard. She received her first course chemotherapy and tolerated it fairly well. No significant tumor lysis. Cleared peripheral blasts early by day 3 of chemo. Fairly uncomplicated course except for Enterococcus Faecalis bacteremia and subsequent line removal.   End of induction BMA/ BX done on 4/23 showed hypocellular bone marrow (40% cellular) with maturing trilineage hematopoiesis, left shifted granulopoiesis and 3% myeloblasts. The overall findings suggest a regenerating marrow. The myeloblasts do not show immunophenotypic atypia seen in the patient's leukemic blasts (aberrant CD7 expression) .  Of note, after obtaining her NGS panel results we reviewed her previous reports her initial disease at age 2 was negative for CEBPA and it was tested as she was part of the [facility] trial. We performed a skin bx to assess for germline CEBPA and results are pending.  Her unrelated bone marrow donor search has shown a suitable match yet. Mom is age 65 and Dad is 78, mom 6/12 dad 8/12 match. One of her half sisters (Rosaura age 46y)  is 7/12 match. Jaylan declined HSCT after discussion with BMT team regarding TRM.  They had also seeked outside opinions which also supported chemotherapy rather than HSCT.    5/5/21 - 5/7/21: HD ARAC, Neupogen 5/9-5/18 6/2/21- admission for HD ARAC/ GO    7/19/21: Admit for Cycle 3 HD ARAC (last cycle of chemo), discharged home on 8/9.     10/15/21: started  "maintenance chemotherapy with oral Azacitidine          Acute myeloid leukemia (CMS/HCC)   9/19/2023 Initial Diagnosis    Acute myeloid leukemia (CMS/HCC)           Review of Systems   Constitutional:  Positive for fatigue and fever. Negative for appetite change.        Runs higher temps in general on aza, has fatigue, overall difficulty with weight loss due to chemo   HENT:   Negative for nosebleeds, sore throat and trouble swallowing.         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 A little drainage from right eye recently.    Respiratory:  Negative for cough, shortness of breath and wheezing.    Cardiovascular:  Negative for chest pain and palpitations.   Gastrointestinal:  Negative for abdominal distention and constipation.        Some RUQ discomfort   Endocrine: Negative.    Genitourinary:  Negative for difficulty urinating, dysuria and hematuria.    Musculoskeletal:         Some diffuse aches   Skin: Negative.    Neurological: Negative.         Some headaches but not severe and manageable   Hematological: Negative.         Some mildly swollen and tender cervical LNs   Psychiatric/Behavioral: Negative.          No new changes from baseline.  On medication for sleep and anxiety       OBJECTIVE:    VS:  /89 (BP Location: Right arm, Patient Position: Sitting, BP Cuff Size: Large adult long)   Pulse 97   Temp 37.6 °C (99.6 °F) (Oral)   Resp 19   Ht 1.689 m (5' 6.5\")   Wt 98.4 kg (216 lb 14.9 oz)   BMI 34.49 kg/m²   BSA: 2.15 meters squared      Physical Exam  Constitutional:       Appearance: Normal appearance.   HENT:      Head: Normocephalic and atraumatic.      Right Ear: External ear normal.      Left Ear: External ear normal.      Nose: Nose normal.      Mouth/Throat:      Mouth: Mucous membranes are moist.      Pharynx: Oropharynx is clear.   Eyes:      Conjunctiva/sclera: Conjunctivae normal.      Comments: Prosthesis right eye   Neck: "      Comments: Cervical Lns are soft and mobile under 2 cm, slightly tender  Cardiovascular:      Rate and Rhythm: Normal rate and regular rhythm.      Heart sounds: Normal heart sounds.   Pulmonary:      Effort: Pulmonary effort is normal.      Breath sounds: Normal breath sounds.   Abdominal:      Palpations: Abdomen is soft.      Comments: Mild distension/bloating   Genitourinary:     Comments: I was not able to palpate inguinal adenopathy  Musculoskeletal:         General: Normal range of motion.      Cervical back: Normal range of motion and neck supple.   Lymphadenopathy:      Cervical: Cervical adenopathy present.   Skin:     General: Skin is warm and dry.   Neurological:      General: No focal deficit present.      Mental Status: She is alert and oriented to person, place, and time. Mental status is at baseline.   Psychiatric:         Mood and Affect: Mood normal.                Laboratory:  The pertinent laboratory results were reviewed and discussed with the patient.  Hospital Outpatient Visit on 03/07/2024   Component Date Value Ref Range Status    Flu A Result 03/07/2024 Not Detected  Not Detected Final    Flu B Result 03/07/2024 Not Detected  Not Detected Final    Coronavirus 2019, PCR 03/07/2024 Not Detected  Not Detected Final    IgG 03/07/2024 589 (L)  700 - 1,600 mg/dL Final    IgA 03/07/2024 62 (L)  70 - 400 mg/dL Final    IgM 03/07/2024 42  40 - 230 mg/dL Final    Sedimentation Rate 03/07/2024 22 (H)  0 - 20 mm/h Final    Color, Urine 03/07/2024 Light-Yellow  Light-Yellow, Yellow, Dark-Yellow Final    Appearance, Urine 03/07/2024 Clear  Clear Final    Specific Gravity, Urine 03/07/2024 1.007  1.005 - 1.035 Final    pH, Urine 03/07/2024 6.0  5.0, 5.5, 6.0, 6.5, 7.0, 7.5, 8.0 Final    Protein, Urine 03/07/2024 NEGATIVE  NEGATIVE, 10 (TRACE), 20 (TRACE) mg/dL Final    Glucose, Urine 03/07/2024 Normal  Normal mg/dL Final    Blood, Urine 03/07/2024 NEGATIVE  NEGATIVE Final    Ketones, Urine 03/07/2024  NEGATIVE  NEGATIVE mg/dL Final    Bilirubin, Urine 03/07/2024 NEGATIVE  NEGATIVE Final    Urobilinogen, Urine 03/07/2024 Normal  Normal mg/dL Final    Nitrite, Urine 03/07/2024 NEGATIVE  NEGATIVE Final    Leukocyte Esterase, Urine 03/07/2024 NEGATIVE  NEGATIVE Final    Ferritin 03/07/2024 1,149 (H)  8 - 150 ng/mL Final    C-Reactive Protein 03/07/2024 0.49  <1.00 mg/dL Final    WBC 03/07/2024 7.9  4.4 - 11.3 x10*3/uL Final    nRBC 03/07/2024 0.0  0.0 - 0.0 /100 WBCs Final    RBC 03/07/2024 4.36  4.00 - 5.20 x10*6/uL Final    Hemoglobin 03/07/2024 12.0  12.0 - 16.0 g/dL Final    Hematocrit 03/07/2024 36.6  36.0 - 46.0 % Final    MCV 03/07/2024 84  80 - 100 fL Final    MCH 03/07/2024 27.5  26.0 - 34.0 pg Final    MCHC 03/07/2024 32.8  32.0 - 36.0 g/dL Final    RDW 03/07/2024 13.2  11.5 - 14.5 % Final    Platelets 03/07/2024 230  150 - 450 x10*3/uL Final    Neutrophils % 03/07/2024 63.2  40.0 - 80.0 % Final    Immature Granulocytes %, Automated 03/07/2024 0.6  0.0 - 0.9 % Final    Immature Granulocyte Count (IG) includes promyelocytes, myelocytes and metamyelocytes but does not include bands. Percent differential counts (%) should be interpreted in the context of the absolute cell counts (cells/UL).    Lymphocytes % 03/07/2024 29.3  13.0 - 44.0 % Final    Monocytes % 03/07/2024 5.0  2.0 - 10.0 % Final    Eosinophils % 03/07/2024 1.6  0.0 - 6.0 % Final    Basophils % 03/07/2024 0.3  0.0 - 2.0 % Final    Neutrophils Absolute 03/07/2024 5.01  1.20 - 7.70 x10*3/uL Final    Percent differential counts (%) should be interpreted in the context of the absolute cell counts (cells/uL).    Immature Granulocytes Absolute, Au* 03/07/2024 0.05  0.00 - 0.70 x10*3/uL Final    Lymphocytes Absolute 03/07/2024 2.32  1.20 - 4.80 x10*3/uL Final    Monocytes Absolute 03/07/2024 0.40  0.10 - 1.00 x10*3/uL Final    Eosinophils Absolute 03/07/2024 0.13  0.00 - 0.70 x10*3/uL Final    Basophils Absolute 03/07/2024 0.02  0.00 - 0.10 x10*3/uL  Final    Albumin 03/07/2024 4.8  3.4 - 5.0 g/dL Final    Bilirubin, Total 03/07/2024 0.4  0.0 - 1.2 mg/dL Final    Bilirubin, Direct 03/07/2024 0.0  0.0 - 0.3 mg/dL Final    Alkaline Phosphatase 03/07/2024 40  33 - 110 U/L Final    ALT 03/07/2024 39  7 - 45 U/L Final    Patients treated with Sulfasalazine may generate falsely decreased results for ALT.    AST 03/07/2024 20  9 - 39 U/L Final    Total Protein 03/07/2024 7.2  6.4 - 8.2 g/dL Final    Glucose 03/07/2024 86  74 - 99 mg/dL Final    Sodium 03/07/2024 137  136 - 145 mmol/L Final    Potassium 03/07/2024 4.1  3.5 - 5.3 mmol/L Final    Chloride 03/07/2024 104  98 - 107 mmol/L Final    Bicarbonate 03/07/2024 21  21 - 32 mmol/L Final    Anion Gap 03/07/2024 16  10 - 20 mmol/L Final    Urea Nitrogen 03/07/2024 11  6 - 23 mg/dL Final    Creatinine 03/07/2024 0.66  0.50 - 1.05 mg/dL Final    eGFR 03/07/2024 >90  >60 mL/min/1.73m*2 Final    Calculations of estimated GFR are performed using the 2021 CKD-EPI Study Refit equation without the race variable for the IDMS-Traceable creatinine methods.  https://jasn.asnjournals.org/content/early/2021/09/22/ASN.8148216716    Calcium 03/07/2024 10.3  8.6 - 10.6 mg/dL Final    Phosphorus 03/07/2024 3.8  2.5 - 4.9 mg/dL Final    The performance characteristics of phosphorus testing in heparinized plasma have been validated by the individual  laboratory site where testing is performed. Testing on heparinized plasma is not approved by the FDA; however, such approval is not necessary.    Group A Strep PCR 03/07/2024 Not Detected  Not Detected Final    This assay is an FDA-cleared, real-time PCR test for the qualitative detection of Group A Streptococcus bacterial DNA from throat swabs that have not undergone a nucleic acid extraction. Negative results do not require confirmation by culture.           ASSESSMENT and PLAN:    Assessment:    Emiliachris, 29 yo with PMH of AML at age 2,with history of denovo v/s relapsed AML with  favorable cytogenetics (FLT3 ITD/TKD negative, biallelic CEBPA and GATA2 mutation). She is s/p cycle 1 of FLAG-HIRO (3/27), and bone marrow biopsy on 4/23 at the end of cycle 1 of chemotherapy shows morphological remission and no evidence of MRD based on flow. She is now s/p 3 cycles of consolidation chemo with HD ARAC (one cycle included one dose of gemtuxumab).  She has been worked up at  for elevated ferritin and mild increase LIC on liver MRI as well as new liver lesion seen on imaging not tender, LFTs normal. She is off of the OCPs due to the liver lesion. She is currently on maintenance chemotherapy with oral Azacitadine managed by adult oncology which was held in October around the time of her wedding and also for almost 2 months due to persistent temps in the  range of unclear etiology.     Etiology of these fevers in not clear yet.  It could be due to her overall lowered immunity (IgG and IgA are mildly low) and she has had a few viral exposures.  She has taken home COVID tests which were negative.  Flu and COVID were negative today.  RAP panel from today is pending. Strep negative. CMV, aspergillus, histo, HIV, quantiferon, Cts Chest/abdomen/pelvis negative.   EBV PCR pending. Sinuses possible source of fever.  Less likely to have invasive fungal infection based on negative markers and CT exam also, less likely to occur on azacitadine.  Has been off of acyclovir recently for about 4 months but doesn't have signs or symptoms of HSV.    Malignancy can cause fevers but no obvious evidence based on scans, or inflammatory markers.  CBC with diff has been normal.  However, will obtain Bone marrow testing through adult oncology to rule out occult malignancy.    Autoimmune causes can trigger fever.,  YNES and RF were tested recently and were negative.     Review of her medications makes drug fever less likely. No new changes in medications recently.    Plan:    Oncology:   - 28 year old female with relapsed  AML s/p cycle 1 FLAG-HIRO in March 2021 and x1 cycle of HD-ARAC and 1x HD Arac + GO. Bone marrow biopsy on 4/23 showed morpholoical remission and no evidence of MRD based on flow. Jaylan is now s/p 3 cycles of Consolidation chemo with HD ARAC (3gm/m2/dose).   - Did not undergo BMT given higher risk for morbidity due to underlying medical issues (please refer to note from Dr Rodríguez BMT physician)  -Jaylan had Bone marrow bx and aspirate done on 8/13. Results of BMBX were negative for MRD  -Jaylan spoke with Dr. Baum in adult Heme/Onc re: vaccine trial. Not eligible for trial currently.  - On oral Azacitidine for maintenance chemotherapy.  Dose reduced to 200 mg. Currently on hold due to fevers.   -Will plan for Bone marrow through adult side next week   Pulmonary:   Previously had reduced PFT results, currently arranging f/u appointment with Pulmonary Service. Repeat PFT scheduled for 10/4, met with Pulmonology. Improved DLCO currently considered in the normal range.    Cardiac:   - Follows with onco-cardiology at .  Continue metoprolol.    Neuro:  neuropathy-we prescribed capsaicin at list visit but she hasn't used it yet.   Has neurologist at   Headaches: - Chemotherapy induced headaches. Neurology has prescribed Amitriptyline 25mg every night. PRN Benadryl for migraine cocktail.   FENGI:  -Jaylan has a history of multiple GI complaints/ IBS. Peds GI recommended Dr. Narinder Zee/ Dr. Maicol Titus on the adult side, shared this with Jaylan. Is having constipation this cycle.  Will increase miralax and can also use senna/colace.    - Kytril working to control nausea, scripts sent for ativan and compazine today  -s/p  MRI of liver 7/13 and saw hepatology at   on 7/11. She will continue to follow with that team.          ID:  -No longer requires PCP ppx  -Immunized in fall 2023 for COVID booster and flu  -Jaylan would like to talk to adult oncology to see if need to continue or if she can dose modify acyclovir.    Labs sent today: candida ab (pending) extended rap (pending) , covid/flu/strep-negative ebv pcr (pending), urine analysis normal today.  CRP was normal.  ESR mild elevation at 22.   -Rechecked YNES today and IgGAM.  Consider seeing immunology for lower IgGAM but may be due to treatment.  Possible benefit from IVIG?  -Would recommend ENT and ID consultations    Reproductive Endo:  Was seen by gyn March 2022, seen in [facility] on 8/22  -recent AMH in normal range at   -hasn't resumed regular menses yet  -using condoms for protection while on chemo  -Alique will talk to gyn considering copper IUD    Immune:    Saw immunology at  previously        RTC: Will come back for follow up with us in 3 mos for exam, count check. To see  Dr Baum adult oncology Monday 3/11      Jazmine Howell MD

## 2024-03-07 NOTE — PROGRESS NOTES
03/07/24 1334   Reason for Consult   Discipline Child Life Specialist   Total Time Spent (min) 30 minutes   Patient Intervention(s)   Type of Intervention Performed Healing environment interventions   Healing Environment Intervention(s) Expressive outlet;Normalization of environment;[place] of milestones   Support Provided to Family   Support Provided to Family Family present for patient session     Family and Child Life Services

## 2024-03-08 LAB
ANA SER QL HEP2 SUBST: NEGATIVE
EBV DNA SPEC NAA+PROBE-LOG#: NORMAL {LOG_COPIES}/ML
LABORATORY COMMENT REPORT: NOT DETECTED

## 2024-03-08 ASSESSMENT — ENCOUNTER SYMPTOMS
ABDOMINAL DISTENTION: 0
CONSTIPATION: 0

## 2024-03-11 ENCOUNTER — OFFICE VISIT (OUTPATIENT)
Dept: HEMATOLOGY/ONCOLOGY | Facility: HOSPITAL | Age: 29
End: 2024-03-11
Payer: COMMERCIAL

## 2024-03-11 VITALS
RESPIRATION RATE: 17 BRPM | SYSTOLIC BLOOD PRESSURE: 129 MMHG | TEMPERATURE: 98.4 F | HEART RATE: 117 BPM | DIASTOLIC BLOOD PRESSURE: 92 MMHG | OXYGEN SATURATION: 100 % | BODY MASS INDEX: 34.32 KG/M2 | WEIGHT: 215.83 LBS

## 2024-03-11 DIAGNOSIS — C92.01 ACUTE MYELOID LEUKEMIA IN REMISSION (MULTI): Primary | ICD-10-CM

## 2024-03-11 DIAGNOSIS — R50.9 FEVER OF UNKNOWN ORIGIN (FUO): ICD-10-CM

## 2024-03-11 DIAGNOSIS — D84.9 IMMUNOCOMPROMISED STATE (MULTI): ICD-10-CM

## 2024-03-11 LAB
ADENOVIRUS RVP, VIRC: NOT DETECTED
ENTEROVIRUS/RHINOVIRUS RVP, VIRC: NOT DETECTED
HUMAN BOCAVIRUS RVP, VIRC: NOT DETECTED
HUMAN CORONAVIRUS RVP, VIRC: NOT DETECTED
INFLUENZA A , VIRC: NOT DETECTED
INFLUENZA A H1N1-09 , VIRC: NOT DETECTED
INFLUENZA B PCR, VIRC: NOT DETECTED
METAPNEUMOVIRUS , VIRC: NOT DETECTED
PARAINFLUENZA PCR, VIRC: NOT DETECTED
RSV PCR, RVP, VIRC: NOT DETECTED

## 2024-03-11 PROCEDURE — 99214 OFFICE O/P EST MOD 30 MIN: CPT | Performed by: INTERNAL MEDICINE

## 2024-03-11 PROCEDURE — 1036F TOBACCO NON-USER: CPT | Performed by: INTERNAL MEDICINE

## 2024-03-11 PROCEDURE — 3080F DIAST BP >= 90 MM HG: CPT | Performed by: INTERNAL MEDICINE

## 2024-03-11 PROCEDURE — 3074F SYST BP LT 130 MM HG: CPT | Performed by: INTERNAL MEDICINE

## 2024-03-11 PROCEDURE — 99214 OFFICE O/P EST MOD 30 MIN: CPT | Mod: GC | Performed by: INTERNAL MEDICINE

## 2024-03-11 ASSESSMENT — ENCOUNTER SYMPTOMS
HEMATOLOGIC/LYMPHATIC NEGATIVE: 1
RESPIRATORY NEGATIVE: 1
PSYCHIATRIC NEGATIVE: 1
ENDOCRINE NEGATIVE: 1
EYES NEGATIVE: 1
FEVER: 1
CARDIOVASCULAR NEGATIVE: 1
NEUROLOGICAL NEGATIVE: 1
MUSCULOSKELETAL NEGATIVE: 1
GASTROINTESTINAL NEGATIVE: 1

## 2024-03-11 ASSESSMENT — PAIN SCALES - GENERAL: PAINLEVEL: 3

## 2024-03-11 NOTE — LETTER
March 12, 2024     Virginie Wood MD   [facility] [id number]    Patient: Jaylan Palmer   YOB: 1995   Date of Visit: 3/11/2024       Dear Dr. Virginie Wood MD:    I saw Ms. Jaylan Palmer yesterday for an evaluation. Below are my notes for this encounter.  On 3/12/2024, she will have a bone marrow biopsy to evaluate for causes of these low grade fevers. 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  I do not believe I will be able to obtain in less than 48 hours here in [place] and we were hopeful we might be able to arrange in [facility] - however, if not, we will work on finding an external site for this.     If you have questions, please do not hesitate to call me.       Sincerely,     Eron Baum MD      CC: MD Jazmine Arnold MD  ______________________________________________________________________________________    .Patient ID: Jaylan Palmer is a 28 y.o. female.  Referring Physician: No referring provider defined for this encounter.  Primary Care Provider: Jazmine Howell MD    Date of Service:  3/11/2024    ASSESSMENT and PLAN:      Problem List Items Addressed This Visit       Acute myeloid leukemia (CMS/HCC) - Primary    Immunocompromised state (CMS/HCC)    Fever of unknown origin (FUO)    Overview     Starting end of 1/2024 and through 3/2024 - recurring daily low grade fevers up to 101.6 at home peak in the afternoon.  Viral studies, cultures, fungal studies w/o clear cause; CT a/p with liver changes.   CT neck/face 2/9/2024 with opacified maxillary sinus - working on ENT appt.     Oral azacitidine on hold since 1/2024.     BMBx on 3/12. Liver MRI to better characterize  lesion.                       Oncology History Overview Note   Jaylan is 27yo young lady, PHD, with a history of AML age 2y, diagnosed and treated here at [facility] per clinical trial [id number] (dauno, Cytarabine, etopo). She was apparently well for nearly 22 years until November of 2020 when she started complaining of multiple vague symptoms including fatigue, headaches, fainting, dizziness, generalized body pain, tiredness, bruising over the arms, petechiae, low grade fevers and heavy menses. CBC showed thrombocytopenia and peripheral circulating blasts concerning for relapse. She subsequently moved here for further treatment    Her exam showed multiple bruises over thighs, legs, arms and petechiae. 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 Lab work showed: CBC 12.9>9.6/30.9<26  ANC 3350  ALC 3230 Blast %44    UA 3.8  RFP: 139/3.7/107/23/15/0.57 Peripheral smear: circulating blasts with Agueda rods consistent with relapsed AML    BMA/Bx done which showed hypercellular marrow involved by 57% blasts consistent with AML.   Cytogenetics, FISH - negative for CBFB (16q22 rearrangement), KMT2A MLL gene rearrangement, p53, RUNX1, 7q- and 5q-  and molecular studies are unremarkable - FLT3 ITD and FLT3 TKD negative. 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 CSF- negative for blasts    Based on the above Jaylan did not have any of the high risk features. 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 We discussed her case at the time in both Adult and Pediatric tumor boards. She was offered to go on Phase III trial with uproleselan administered with chemo vs chemo alone in relapsed/refractory AML. However after careful consideration, Jaylan declined the trial  Subsequently proceeded with idarubicin-FLAG regimen.  She had an ECHO, which showed normal EF69%  normal cardiac function.  She has prior cum anthracycline dose of 340 mg/m2.  Dose reduced cecy from 10mg/m2 to 8mg/m2 and administered zinecard. She received her first course chemotherapy and tolerated it fairly well. No significant tumor lysis. Cleared peripheral blasts early by day 3 of chemo. Fairly uncomplicated course except for Enterococcus Faecalis bacteremia and subsequent line removal.   End of induction BMA/ BX done on 4/23 showed hypocellular bone marrow (40% cellular) with maturing trilineage hematopoiesis, left shifted granulopoiesis and 3% myeloblasts. The overall findings suggest a regenerating marrow. The myeloblasts do not show immunophenotypic atypia seen in the patient's leukemic blasts (aberrant CD7 expression) .  Of note, after obtaining her NGS panel results we reviewed her previous reports her initial disease at age 2 was negative for CEBPA and it was tested as she was part [place] [facility] [place]. We performed a skin bx to assess for germline CEBPA and results are pending.  Her unrelated bone marrow donor search has shown a suitable match yet. Mom is age 65 and Dad is 78, mom 6/12 dad 8/12 match. One of her half sisters (Rosaura age 46y)  is 7/12 match. Jaylan declined HSCT after discussion with BMT team regarding TRM.  They had also seeked outside opinions which also supported chemotherapy rather than HSCT.    5/5/21 - 5/7/21: HD ARAC, Neupogen 5/9-5/18 6/2/21- admission for HD ARAC/ GO    7/19/21: Admit for Cycle 3 HD ARAC (last cycle of chemo), discharged home on 8/9.     10/15/21: started maintenance chemotherapy with oral Azacitidine          Acute myeloid leukemia (CMS/HCC)   9/19/2023 Initial Diagnosis    Acute myeloid leukemia (CMS/HCC)          Subjective      History of Present Illness:  Pt is here today (3/11/2024) with her  and mother. Still has daily fever peak in the afternoon. She feels more tired then usual. She also has persistent sinus tachycardia with and without fever.      Reviewed and Past Medical History, Past Surgical History, Family History, and Social History:    Review of Systems   Constitutional:  Positive for fever.   HENT: Negative.     Eyes: Negative.    Respiratory: Negative.     Cardiovascular: Negative.    Gastrointestinal: Negative.    Endocrine: Negative.    Musculoskeletal: Negative.    Neurological: Negative.    Hematological: Negative.    Psychiatric/Behavioral: Negative.         Home Medications and Adherence Reviewed with Patient.       Objective     VS:  BP (!) 129/92   Pulse (!) 117   Temp 36.9 °C (98.4 °F)   Resp 17   Wt 97.9 kg (215 lb 13.3 oz)   SpO2 100%   BMI 34.32 kg/m²   BSA: 2.14 meters squared    Physical Exam  Constitutional:       Appearance: Normal appearance.   HENT:      Head: Normocephalic and atraumatic.      Mouth/Throat:      Mouth: Mucous membranes are moist.   Eyes:      Extraocular Movements: Extraocular movements intact.      Pupils: Pupils are equal, round, and reactive to light.   Cardiovascular:      Rate and Rhythm: Regular rhythm. Tachycardia present.   Pulmonary:      Effort: Pulmonary effort is normal.      Breath sounds: Normal breath sounds.   Abdominal:      General: Abdomen is flat.      Palpations: Abdomen is soft.   Musculoskeletal:         General: Normal range of motion.      Cervical back: Normal range of motion and neck supple.   Skin:     General: Skin is warm and dry.   Neurological:      General: No focal deficit present.      Mental Status: She is alert and oriented to person, place, and time.         Laboratory:  Hospital Outpatient Visit on 03/07/2024   Component Date Value Ref Range Status   • Flu A Result 03/07/2024 Not Detected  Not Detected Final   • Flu B Result 03/07/2024 Not Detected  Not Detected Final   • Coronavirus 2019, PCR 03/07/2024 Not Detected  Not Detected Final   • EBV DNA Result 03/07/2024 Not Detected  Not Detected Final   • EBV PCR Plasma Log 03/07/2024    Final   • YNES 03/07/2024  Negative  Negative Final   • IgG 03/07/2024 589 (L)  700 - 1,600 mg/dL Final   • IgA 03/07/2024 62 (L)  70 - 400 mg/dL Final   • IgM 03/07/2024 42  40 - 230 mg/dL Final   • Sedimentation Rate 03/07/2024 22 (H)  0 - 20 mm/h Final   • Color, Urine 03/07/2024 Light-Yellow  Light-Yellow, Yellow, Dark-Yellow Final   • Appearance, Urine 03/07/2024 Clear  Clear Final   • Specific Gravity, Urine 03/07/2024 1.007  1.005 - 1.035 Final   • pH, Urine 03/07/2024 6.0  5.0, 5.5, 6.0, 6.5, 7.0, 7.5, 8.0 Final   • Protein, Urine 03/07/2024 NEGATIVE  NEGATIVE, 10 (TRACE), 20 (TRACE) mg/dL Final   • Glucose, Urine 03/07/2024 Normal  Normal mg/dL Final   • Blood, Urine 03/07/2024 NEGATIVE  NEGATIVE Final   • Ketones, Urine 03/07/2024 NEGATIVE  NEGATIVE mg/dL Final   • Bilirubin, Urine 03/07/2024 NEGATIVE  NEGATIVE Final   • Urobilinogen, Urine 03/07/2024 Normal  Normal mg/dL Final   • Nitrite, Urine 03/07/2024 NEGATIVE  NEGATIVE Final   • Leukocyte Esterase, Urine 03/07/2024 NEGATIVE  NEGATIVE Final   • Ferritin 03/07/2024 1,149 (H)  8 - 150 ng/mL Final   • C-Reactive Protein 03/07/2024 0.49  <1.00 mg/dL Final   • WBC 03/07/2024 7.9  4.4 - 11.3 x10*3/uL Final   • nRBC 03/07/2024 0.0  0.0 - 0.0 /100 WBCs Final   • RBC 03/07/2024 4.36  4.00 - 5.20 x10*6/uL Final   • Hemoglobin 03/07/2024 12.0  12.0 - 16.0 g/dL Final   • Hematocrit 03/07/2024 36.6  36.0 - 46.0 % Final   • MCV 03/07/2024 84  80 - 100 fL Final   • MCH 03/07/2024 27.5  26.0 - 34.0 pg Final   • MCHC 03/07/2024 32.8  32.0 - 36.0 g/dL Final   • RDW 03/07/2024 13.2  11.5 - 14.5 % Final   • Platelets 03/07/2024 230  150 - 450 x10*3/uL Final   • Neutrophils % 03/07/2024 63.2  40.0 - 80.0 % Final   • Immature Granulocytes %, Automated 03/07/2024 0.6  0.0 - 0.9 % Final   • Lymphocytes % 03/07/2024 29.3  13.0 - 44.0 % Final   • Monocytes % 03/07/2024 5.0  2.0 - 10.0 % Final   • Eosinophils % 03/07/2024 1.6  0.0 - 6.0 % Final   • Basophils % 03/07/2024 0.3  0.0 - 2.0 % Final   •  Neutrophils Absolute 03/07/2024 5.01  1.20 - 7.70 x10*3/uL Final   • Immature Granulocytes Absolute, Au* 03/07/2024 0.05  0.00 - 0.70 x10*3/uL Final   • Lymphocytes Absolute 03/07/2024 2.32  1.20 - 4.80 x10*3/uL Final   • Monocytes Absolute 03/07/2024 0.40  0.10 - 1.00 x10*3/uL Final   • Eosinophils Absolute 03/07/2024 0.13  0.00 - 0.70 x10*3/uL Final   • Basophils Absolute 03/07/2024 0.02  0.00 - 0.10 x10*3/uL Final   • Albumin 03/07/2024 4.8  3.4 - 5.0 g/dL Final   • Bilirubin, Total 03/07/2024 0.4  0.0 - 1.2 mg/dL Final   • Bilirubin, Direct 03/07/2024 0.0  0.0 - 0.3 mg/dL Final   • Alkaline Phosphatase 03/07/2024 40  33 - 110 U/L Final   • ALT 03/07/2024 39  7 - 45 U/L Final   • AST 03/07/2024 20  9 - 39 U/L Final   • Total Protein 03/07/2024 7.2  6.4 - 8.2 g/dL Final   • Glucose 03/07/2024 86  74 - 99 mg/dL Final   • Sodium 03/07/2024 137  136 - 145 mmol/L Final   • Potassium 03/07/2024 4.1  3.5 - 5.3 mmol/L Final   • Chloride 03/07/2024 104  98 - 107 mmol/L Final   • Bicarbonate 03/07/2024 21  21 - 32 mmol/L Final   • Anion Gap 03/07/2024 16  10 - 20 mmol/L Final   • Urea Nitrogen 03/07/2024 11  6 - 23 mg/dL Final   • Creatinine 03/07/2024 0.66  0.50 - 1.05 mg/dL Final   • eGFR 03/07/2024 >90  >60 mL/min/1.73m*2 Final   • Calcium 03/07/2024 10.3  8.6 - 10.6 mg/dL Final   • Phosphorus 03/07/2024 3.8  2.5 - 4.9 mg/dL Final   • Group A Strep PCR 03/07/2024 Not Detected  Not Detected Final         Pathology:  The pertinent pathology results were reviewed and discussed with the patient.    Imaging:  The pertinent imaging results were reviewed and discussed with the patient.        Keiko Michelle MD      Attestation signed by Eron Baum MD at 3/12/2024  9:30 AM:  I saw and evaluated the patient.  I personally obtained the key and critical portions of the history and physical exam or was physically present for key and critical portions performed by the hematology and oncology fellow. I reviewed the  fellow's documentation and discussed the patient with the fellow.  I agree with the fellow's medical decision making as documented in the fellow’s note with the exception/addition of the following:     Ms. Palmer has been struggling with recurring low grade fevers associated with minimal other symptoms - workup has only re-identified a chronically opacified maxillary sinus - other CT imaging has been unremarkable.     We will obtain an interval bone marrow, and including infectious workup as well, and flow-based MRD testing.     Her CT scan was also not able to appreciate the liver focal nodular hyperplasia mass from 2022, and so will work with her provider team (here and @ [facility]) to try and arrange a liver MRI.

## 2024-03-11 NOTE — PROGRESS NOTES
.Patient ID: Jaylan Palmer is a 28 y.o. female.  Referring Physician: No referring provider defined for this encounter.  Primary Care Provider: Jazmine Howell MD    Date of Service:  3/11/2024    ASSESSMENT and PLAN:      Problem List Items Addressed This Visit       Acute myeloid leukemia (CMS/HCC) - Primary    Immunocompromised state (CMS/HCC)    Fever of unknown origin (FUO)    Overview     Starting end of 1/2024 and through 3/2024 - recurring daily low grade fevers up to 101.6 at home peak in the afternoon.  Viral studies, cultures, fungal studies w/o clear cause; CT a/p with liver changes.   CT neck/face 2/9/2024 with opacified maxillary sinus - working on ENT appt.     Oral azacitidine on hold since 1/2024.     BMBx on 3/12. Liver MRI to better characterize lesion.                       Oncology History Overview Note   Jaylan is 25yo young lady, PHD, with a history of AML age 2y, diagnosed and treated here at [facility] per clinical trial [id number] (dauno, Cytarabine, etopo). She was apparently well for nearly 22 years until November of 2020 when she started complaining of multiple vague symptoms including fatigue, headaches, fainting, dizziness, generalized body pain, tiredness, bruising over the arms, petechiae, low grade fevers and heavy menses. CBC showed thrombocytopenia and peripheral circulating blasts concerning for relapse. She subsequently moved here for further treatment    Her exam showed multiple bruises over thighs, legs, arms and petechiae. 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 Lab work showed: CBC 12.9>9.6/30.9<26  ANC 3350  ALC 3230 Blast %44    UA 3.8  RFP: 139/3.7/107/23/15/0.57 Peripheral smear: circulating blasts with Agueda rods consistent with relapsed AML    BMA/Bx done which showed hypercellular marrow involved by 57% blasts consistent with AML.   Cytogenetics, FISH - negative for CBFB (16q22 rearrangement), KMT2A  MLL gene rearrangement, p53, RUNX1, 7q- and 5q-  and molecular studies are unremarkable - FLT3 ITD and FLT3 TKD negative. 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 CSF- negative for blasts    Based on the above Jaylan did not have any of the high risk features. 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 We discussed her case at the time in both Adult and Pediatric tumor boards. She was offered to go on Phase III trial with uproleselan administered with chemo vs chemo alone in relapsed/refractory AML. However after careful consideration, Jaylan declined the trial  Subsequently proceeded with idarubicin-FLAG regimen.  She had an ECHO, which showed normal EF69% normal cardiac function.  She has prior cum anthracycline dose of 340 mg/m2.  Dose reduced cecy from 10mg/m2 to 8mg/m2 and administered zinecard. She received her first course chemotherapy and tolerated it fairly well. No significant tumor lysis. Cleared peripheral blasts early by day 3 of chemo. Fairly uncomplicated course except for Enterococcus Faecalis bacteremia and subsequent line removal.   End of induction BMA/ BX done on 4/23 showed hypocellular bone marrow (40% cellular) with maturing trilineage hematopoiesis, left shifted granulopoiesis and 3% myeloblasts. The overall findings suggest a regenerating marrow. The myeloblasts do not show immunophenotypic atypia seen in the patient's leukemic blasts (aberrant CD7 expression) .  Of note, after obtaining her NGS panel results we reviewed her previous reports her initial disease at age 2 was negative for CEBPA and it was tested as she was part of the [facility] trial. We performed a skin bx to assess for germline CEBPA and results are pending.  Her unrelated bone marrow donor search has shown a suitable match yet. Mom is age 65 and Dad is 78, mom 6/12 dad 8/12 match. One of her half sisters (Rosaura age 46y)   is 7/12 match. Jaylan declined HSCT after discussion with BMT team regarding TRM.  They had also seeked outside opinions which also supported chemotherapy rather than HSCT.    5/5/21 - 5/7/21: HD ARAC, Neupogen 5/9-5/18 6/2/21- admission for HD ARAC/ GO    7/19/21: Admit for Cycle 3 HD ARAC (last cycle of chemo), discharged home on 8/9.     10/15/21: started maintenance chemotherapy with oral Azacitidine          Acute myeloid leukemia (CMS/HCC)   9/19/2023 Initial Diagnosis    Acute myeloid leukemia (CMS/HCC)          Subjective       History of Present Illness:  Pt is here today (3/11/2024) with her  and mother. Still has daily fever peak in the afternoon. She feels more tired then usual. She also has persistent sinus tachycardia with and without fever.     Reviewed and Past Medical History, Past Surgical History, Family History, and Social History:    Review of Systems   Constitutional:  Positive for fever.   HENT: Negative.     Eyes: Negative.    Respiratory: Negative.     Cardiovascular: Negative.    Gastrointestinal: Negative.    Endocrine: Negative.    Musculoskeletal: Negative.    Neurological: Negative.    Hematological: Negative.    Psychiatric/Behavioral: Negative.         Home Medications and Adherence Reviewed with Patient.       Objective      VS:  BP (!) 129/92   Pulse (!) 117   Temp 36.9 °C (98.4 °F)   Resp 17   Wt 97.9 kg (215 lb 13.3 oz)   SpO2 100%   BMI 34.32 kg/m²   BSA: 2.14 meters squared    Physical Exam  Constitutional:       Appearance: Normal appearance.   HENT:      Head: Normocephalic and atraumatic.      Mouth/Throat:      Mouth: Mucous membranes are moist.   Eyes:      Extraocular Movements: Extraocular movements intact.      Pupils: Pupils are equal, round, and reactive to light.   Cardiovascular:      Rate and Rhythm: Regular rhythm. Tachycardia present.   Pulmonary:      Effort: Pulmonary effort is normal.      Breath sounds: Normal breath sounds.   Abdominal:       General: Abdomen is flat.      Palpations: Abdomen is soft.   Musculoskeletal:         General: Normal range of motion.      Cervical back: Normal range of motion and neck supple.   Skin:     General: Skin is warm and dry.   Neurological:      General: No focal deficit present.      Mental Status: She is alert and oriented to person, place, and time.         Laboratory:  Hospital Outpatient Visit on 03/07/2024   Component Date Value Ref Range Status    Flu A Result 03/07/2024 Not Detected  Not Detected Final    Flu B Result 03/07/2024 Not Detected  Not Detected Final    Coronavirus 2019, PCR 03/07/2024 Not Detected  Not Detected Final    EBV DNA Result 03/07/2024 Not Detected  Not Detected Final    EBV PCR Plasma Log 03/07/2024    Final    YNES 03/07/2024 Negative  Negative Final    IgG 03/07/2024 589 (L)  700 - 1,600 mg/dL Final    IgA 03/07/2024 62 (L)  70 - 400 mg/dL Final    IgM 03/07/2024 42  40 - 230 mg/dL Final    Sedimentation Rate 03/07/2024 22 (H)  0 - 20 mm/h Final    Color, Urine 03/07/2024 Light-Yellow  Light-Yellow, Yellow, Dark-Yellow Final    Appearance, Urine 03/07/2024 Clear  Clear Final    Specific Gravity, Urine 03/07/2024 1.007  1.005 - 1.035 Final    pH, Urine 03/07/2024 6.0  5.0, 5.5, 6.0, 6.5, 7.0, 7.5, 8.0 Final    Protein, Urine 03/07/2024 NEGATIVE  NEGATIVE, 10 (TRACE), 20 (TRACE) mg/dL Final    Glucose, Urine 03/07/2024 Normal  Normal mg/dL Final    Blood, Urine 03/07/2024 NEGATIVE  NEGATIVE Final    Ketones, Urine 03/07/2024 NEGATIVE  NEGATIVE mg/dL Final    Bilirubin, Urine 03/07/2024 NEGATIVE  NEGATIVE Final    Urobilinogen, Urine 03/07/2024 Normal  Normal mg/dL Final    Nitrite, Urine 03/07/2024 NEGATIVE  NEGATIVE Final    Leukocyte Esterase, Urine 03/07/2024 NEGATIVE  NEGATIVE Final    Ferritin 03/07/2024 1,149 (H)  8 - 150 ng/mL Final    C-Reactive Protein 03/07/2024 0.49  <1.00 mg/dL Final    WBC 03/07/2024 7.9  4.4 - 11.3 x10*3/uL Final    nRBC 03/07/2024 0.0  0.0 - 0.0 /100 WBCs  Final    RBC 03/07/2024 4.36  4.00 - 5.20 x10*6/uL Final    Hemoglobin 03/07/2024 12.0  12.0 - 16.0 g/dL Final    Hematocrit 03/07/2024 36.6  36.0 - 46.0 % Final    MCV 03/07/2024 84  80 - 100 fL Final    MCH 03/07/2024 27.5  26.0 - 34.0 pg Final    MCHC 03/07/2024 32.8  32.0 - 36.0 g/dL Final    RDW 03/07/2024 13.2  11.5 - 14.5 % Final    Platelets 03/07/2024 230  150 - 450 x10*3/uL Final    Neutrophils % 03/07/2024 63.2  40.0 - 80.0 % Final    Immature Granulocytes %, Automated 03/07/2024 0.6  0.0 - 0.9 % Final    Lymphocytes % 03/07/2024 29.3  13.0 - 44.0 % Final    Monocytes % 03/07/2024 5.0  2.0 - 10.0 % Final    Eosinophils % 03/07/2024 1.6  0.0 - 6.0 % Final    Basophils % 03/07/2024 0.3  0.0 - 2.0 % Final    Neutrophils Absolute 03/07/2024 5.01  1.20 - 7.70 x10*3/uL Final    Immature Granulocytes Absolute, Au* 03/07/2024 0.05  0.00 - 0.70 x10*3/uL Final    Lymphocytes Absolute 03/07/2024 2.32  1.20 - 4.80 x10*3/uL Final    Monocytes Absolute 03/07/2024 0.40  0.10 - 1.00 x10*3/uL Final    Eosinophils Absolute 03/07/2024 0.13  0.00 - 0.70 x10*3/uL Final    Basophils Absolute 03/07/2024 0.02  0.00 - 0.10 x10*3/uL Final    Albumin 03/07/2024 4.8  3.4 - 5.0 g/dL Final    Bilirubin, Total 03/07/2024 0.4  0.0 - 1.2 mg/dL Final    Bilirubin, Direct 03/07/2024 0.0  0.0 - 0.3 mg/dL Final    Alkaline Phosphatase 03/07/2024 40  33 - 110 U/L Final    ALT 03/07/2024 39  7 - 45 U/L Final    AST 03/07/2024 20  9 - 39 U/L Final    Total Protein 03/07/2024 7.2  6.4 - 8.2 g/dL Final    Glucose 03/07/2024 86  74 - 99 mg/dL Final    Sodium 03/07/2024 137  136 - 145 mmol/L Final    Potassium 03/07/2024 4.1  3.5 - 5.3 mmol/L Final    Chloride 03/07/2024 104  98 - 107 mmol/L Final    Bicarbonate 03/07/2024 21  21 - 32 mmol/L Final    Anion Gap 03/07/2024 16  10 - 20 mmol/L Final    Urea Nitrogen 03/07/2024 11  6 - 23 mg/dL Final    Creatinine 03/07/2024 0.66  0.50 - 1.05 mg/dL Final    eGFR 03/07/2024 >90  >60 mL/min/1.73m*2 Final     Calcium 03/07/2024 10.3  8.6 - 10.6 mg/dL Final    Phosphorus 03/07/2024 3.8  2.5 - 4.9 mg/dL Final    Group A Strep PCR 03/07/2024 Not Detected  Not Detected Final         Pathology:  The pertinent pathology results were reviewed and discussed with the patient.    Imaging:  The pertinent imaging results were reviewed and discussed with the patient.        Keiko Michelle MD

## 2024-03-12 ENCOUNTER — HOSPITAL ENCOUNTER (OUTPATIENT)
Dept: RADIOLOGY | Facility: HOSPITAL | Age: 29
Discharge: HOME | End: 2024-03-12
Payer: COMMERCIAL

## 2024-03-12 VITALS
HEART RATE: 70 BPM | OXYGEN SATURATION: 97 % | SYSTOLIC BLOOD PRESSURE: 109 MMHG | DIASTOLIC BLOOD PRESSURE: 74 MMHG | RESPIRATION RATE: 16 BRPM

## 2024-03-12 DIAGNOSIS — C92.90: ICD-10-CM

## 2024-03-12 DIAGNOSIS — R50.9 FEVER OF UNKNOWN ORIGIN: ICD-10-CM

## 2024-03-12 PROCEDURE — 85097 BONE MARROW INTERPRETATION: CPT | Mod: TC | Performed by: INTERNAL MEDICINE

## 2024-03-12 PROCEDURE — 88305 TISSUE EXAM BY PATHOLOGIST: CPT | Performed by: PATHOLOGY

## 2024-03-12 PROCEDURE — 88237 TISSUE CULTURE BONE MARROW: CPT | Performed by: INTERNAL MEDICINE

## 2024-03-12 PROCEDURE — 87070 CULTURE OTHR SPECIMN AEROBIC: CPT | Performed by: INTERNAL MEDICINE

## 2024-03-12 PROCEDURE — 88189 FLOWCYTOMETRY/READ 16 & >: CPT | Performed by: INTERNAL MEDICINE

## 2024-03-12 PROCEDURE — 77012 CT SCAN FOR NEEDLE BIOPSY: CPT

## 2024-03-12 PROCEDURE — 88311 DECALCIFY TISSUE: CPT | Performed by: PATHOLOGY

## 2024-03-12 PROCEDURE — 2720000007 HC OR 272 NO HCPCS

## 2024-03-12 PROCEDURE — 2500000004 HC RX 250 GENERAL PHARMACY W/ HCPCS (ALT 636 FOR OP/ED): Performed by: RADIOLOGY

## 2024-03-12 PROCEDURE — 38222 DX BONE MARROW BX & ASPIR: CPT | Performed by: RADIOLOGY

## 2024-03-12 PROCEDURE — 77012 CT SCAN FOR NEEDLE BIOPSY: CPT | Performed by: RADIOLOGY

## 2024-03-12 PROCEDURE — 87102 FUNGUS ISOLATION CULTURE: CPT | Performed by: INTERNAL MEDICINE

## 2024-03-12 PROCEDURE — 85097 BONE MARROW INTERPRETATION: CPT | Mod: TC,SUR | Performed by: INTERNAL MEDICINE

## 2024-03-12 PROCEDURE — 87116 MYCOBACTERIA CULTURE: CPT | Performed by: INTERNAL MEDICINE

## 2024-03-12 PROCEDURE — 88185 FLOWCYTOMETRY/TC ADD-ON: CPT | Mod: TC | Performed by: INTERNAL MEDICINE

## 2024-03-12 PROCEDURE — C1830 POWER BONE MARROW BX NEEDLE: HCPCS

## 2024-03-12 PROCEDURE — 36415 COLL VENOUS BLD VENIPUNCTURE: CPT | Performed by: INTERNAL MEDICINE

## 2024-03-12 PROCEDURE — 99152 MOD SED SAME PHYS/QHP 5/>YRS: CPT | Performed by: RADIOLOGY

## 2024-03-12 PROCEDURE — 99152 MOD SED SAME PHYS/QHP 5/>YRS: CPT

## 2024-03-12 PROCEDURE — 88313 SPECIAL STAINS GROUP 2: CPT | Performed by: PATHOLOGY

## 2024-03-12 RX ORDER — FENTANYL CITRATE 50 UG/ML
INJECTION, SOLUTION INTRAMUSCULAR; INTRAVENOUS
Status: COMPLETED | OUTPATIENT
Start: 2024-03-12 | End: 2024-03-12

## 2024-03-12 RX ORDER — MIDAZOLAM HYDROCHLORIDE 1 MG/ML
INJECTION INTRAMUSCULAR; INTRAVENOUS
Status: COMPLETED | OUTPATIENT
Start: 2024-03-12 | End: 2024-03-12

## 2024-03-12 RX ADMIN — MIDAZOLAM HYDROCHLORIDE 1 MG: 1 INJECTION, SOLUTION INTRAMUSCULAR; INTRAVENOUS at 14:17

## 2024-03-12 RX ADMIN — FENTANYL CITRATE 50 MCG: 50 INJECTION, SOLUTION INTRAMUSCULAR; INTRAVENOUS at 14:12

## 2024-03-12 RX ADMIN — MIDAZOLAM HYDROCHLORIDE 1 MG: 1 INJECTION, SOLUTION INTRAMUSCULAR; INTRAVENOUS at 14:12

## 2024-03-12 RX ADMIN — FENTANYL CITRATE 50 MCG: 50 INJECTION, SOLUTION INTRAMUSCULAR; INTRAVENOUS at 14:17

## 2024-03-12 ASSESSMENT — PAIN - FUNCTIONAL ASSESSMENT
PAIN_FUNCTIONAL_ASSESSMENT: 0-10

## 2024-03-12 ASSESSMENT — PAIN SCALES - GENERAL
PAINLEVEL_OUTOF10: 0 - NO PAIN

## 2024-03-12 NOTE — POST-PROCEDURE NOTE
Interventional Radiology Brief Postprocedure Note    Attending: Adilene Casas    Assistant: Anders Cruz MD    Diagnosis: AML with fever of unknown origin.    Description of procedure:     The patient was placed in [prone] position on the interventional CT scanner bed. An initial  image and axial noncontrast CT images of the pelvis were obtained. Imaging findings are discussed below. A posterior percutaneous approach was chosen for biopsy of the right iliac. The skin site was marked, prepped, and draped in usual sterile fashion. Local anesthesia of the skin and deep tissues was administered with 1% lidocaine.  An 11 gauge coaxial needle system was advanced to the iliac and seated within the bone. Initial marrow aspirate was obtained, which was found to contain spicules. Then, multiple additional aspirates were obtained in heparinized and non-heparinized syringes. Finally, core biopsy was performed through the coaxial system using a powered 13 gauge biopsy needle (one core specimen obtained). The needles were removed and sterile dressing applied.   Samples were deemed adequate by the bone marrow pathology team in the procedure area and taken for analysis.  The patient tolerated the procedure well without evidence of immediate complication. The patient was then monitored in the radiology recovery area for approximately 1 hour prior to discharge.     Anesthesia:  Local and mild sedation    Complications: None    Estimated Blood Loss: minimal    Medications (Filter: Administrations occurring from 1400 to 1427 on 03/12/24) As of 03/12/24 1427      fentaNYL PF (Sublimaze) injection (mcg) Total dose:  100 mcg      Date/Time Rate/Dose/Volume Action       03/12/24  1412 50 mcg Given      1417 50 mcg Given               midazolam (Versed) injection (mg) Total dose:  2 mg      Date/Time Rate/Dose/Volume Action       03/12/24  1412 1 mg Given      1417 1 mg Given                     See detailed result report with  images in PACS.    The patient tolerated the procedure well without incident or complication and is in stable condition.

## 2024-03-12 NOTE — Clinical Note
Bone marrow bx complete. 2 mg of versed and 100 mcg of fentanyl given throughout procedure. 1 core sample taken containing 12cc of bone marrow. VSS. Dressing C/D/I. Report given to RPCU RN. Recovery for 1 hr per MD

## 2024-03-12 NOTE — DISCHARGE INSTRUCTIONS
You received moderate sedation:  - Do not drive a car, or operate any machinery or power tools of any kind.  - Do not drink any alcoholic drinks.  - Do not take any over the counter medications that may cause drowsiness.  - Do not make any important decisions or sign any legal documents.  - You need to have a responsible adult accompany you home.  - You may resume your normal diet.  - We strongly suggest that a responsible adult be with you for the rest of the day and also during the night. This is for your protection and safety.     For questions related to your procedure:  Please call [phone] between the hours of 7:00am-5:00pm Monday through Friday.  Please call [phone] after 5:00pm and on weekends and holidays.     In the event of an emergency call 911 or go to your nearest emergency room.

## 2024-03-13 LAB — C ALBICANS AB SER QL ID: NEGATIVE

## 2024-03-14 DIAGNOSIS — F41.9 ANXIETY: Primary | ICD-10-CM

## 2024-03-14 LAB
BACTERIA SPEC CULT: NORMAL
GRAM STN SPEC: NORMAL
GRAM STN SPEC: NORMAL
PATH REPORT.COMMENTS IMP SPEC: NORMAL
PATH REPORT.FINAL DX SPEC: NORMAL
PATH REPORT.GROSS SPEC: NORMAL
PATH REPORT.MICROSCOPIC SPEC OTHER STN: NORMAL
PATH REPORT.RELEVANT HX SPEC: NORMAL
PATH REPORT.TOTAL CANCER: NORMAL

## 2024-03-14 RX ORDER — LORAZEPAM 1 MG/1
1 TABLET ORAL EVERY 6 HOURS PRN
Qty: 30 TABLET | Refills: 0 | Status: SHIPPED | OUTPATIENT
Start: 2024-03-14

## 2024-03-15 LAB
CELL COUNT (BLOOD): 13.25 X10*3/UL
CELL POPULATIONS: NORMAL
DIAGNOSIS: NORMAL
FLOW DIFFERENTIAL: NORMAL
FLOW TEST ORDERED: NORMAL
LAB TEST METHOD: NORMAL
NUMBER OF CELLS COLLECTED: NORMAL
PATH REPORT.TOTAL CANCER: NORMAL
SIGNATURE COMMENT: NORMAL
SPECIMEN VIABILITY: NORMAL

## 2024-03-18 ENCOUNTER — TELEMEDICINE (OUTPATIENT)
Dept: HEMATOLOGY/ONCOLOGY | Facility: HOSPITAL | Age: 29
End: 2024-03-18
Payer: COMMERCIAL

## 2024-03-18 DIAGNOSIS — B37.81 THRUSH OF MOUTH AND ESOPHAGUS (MULTI): ICD-10-CM

## 2024-03-18 DIAGNOSIS — B37.0 THRUSH OF MOUTH AND ESOPHAGUS (MULTI): ICD-10-CM

## 2024-03-18 DIAGNOSIS — R50.9 FEVER OF UNKNOWN ORIGIN (FUO): Primary | ICD-10-CM

## 2024-03-18 PROCEDURE — 99213 OFFICE O/P EST LOW 20 MIN: CPT | Performed by: INTERNAL MEDICINE

## 2024-03-18 PROCEDURE — 1036F TOBACCO NON-USER: CPT | Performed by: INTERNAL MEDICINE

## 2024-03-18 RX ORDER — FLUCONAZOLE 200 MG/1
200 TABLET ORAL DAILY
Qty: 14 TABLET | Refills: 0 | Status: SHIPPED | OUTPATIENT
Start: 2024-03-18 | End: 2024-04-01

## 2024-03-18 NOTE — PROGRESS NOTES
Patient ID: Jaylan Palmer is a 28 y.o. female.  Referring Physician: No referring provider defined for this encounter.  Primary Care Provider: Jazmine Howell MD    Date of Service:  3/18/2024    ASSESSMENT and PLAN:      Problem List Items Addressed This Visit       Fever of unknown origin (FUO) - Primary    Overview     Starting end of 1/2024 and through 3/2024 - recurring daily low grade fevers up to 101.6 at home peak in the afternoon.  Viral studies, cultures, fungal studies w/o clear cause; CT a/p with liver changes.   CT neck/face 2/9/2024 with opacified maxillary sinus - working on ENT appt.     Oral azacitidine on hold since 1/2024.     BMBx on 3/12 negative for any evidence of leukemia; Marrow cultures pending, but negative for bacterial, fungal.  AFB pending.     Pending Liver MRI to better characterize lesion and appt with ENT to evaluate a chronically opacified sinus as possible cause.          Current Assessment & Plan     3/18/2024 - I discussed that if current workup is negative, next consideration would be endocrine or rheumatologic - but will follow up pending exams.          Relevant Medications    fluconazole (Diflucan) 200 mg tablet     Other Visit Diagnoses       Thrush of mouth and esophagus (CMS/HCC)        Relevant Medications    fluconazole (Diflucan) 200 mg tablet                      Eron Baum MD      Verbal consent was requested and obtained from patient on this date for a telehealth visit.    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 She was apparently well for nearly 22 years until November of 2020 when she started complaining of multiple vague symptoms including fatigue, headaches, fainting, dizziness, generalized body pain, tiredness, bruising over the arms, petechiae, low grade fevers and heavy menses. CBC showed  thrombocytopenia and peripheral circulating blasts concerning for relapse. She subsequently moved here for further treatment    Her exam showed multiple bruises over thighs, legs, arms and petechiae. 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 Lab work showed: CBC 12.9>9.6/30.9<26  ANC 3350  ALC 3230 Blast %44    UA 3.8  RFP: 139/3.7/107/23/15/0.57 Peripheral smear: circulating blasts with Agueda rods consistent with relapsed AML    BMA/Bx done which showed hypercellular marrow involved by 57% blasts consistent with AML.   Cytogenetics, FISH - negative for CBFB (16q22 rearrangement), KMT2A MLL gene rearrangement, p53, RUNX1, 7q- and 5q-  and molecular studies are unremarkable - FLT3 ITD and FLT3 TKD negative. 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 CSF- negative for blasts    Based on the above Jaylan did not have any of the high risk features. 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 We discussed her case at the time in both Adult and Pediatric tumor boards. She was offered to go on Phase III trial with uproleselan administered with chemo vs chemo alone in relapsed/refractory AML. However after careful consideration, Jaylan declined the trial  Subsequently proceeded with idarubicin-FLAG regimen.  She had an ECHO, which showed normal EF69% normal cardiac function.  She has prior cum anthracycline dose of 340 mg/m2.  Dose reduced cecy from 10mg/m2 to 8mg/m2 and administered zinecard. She received her first course chemotherapy and tolerated it fairly well. No significant tumor lysis. Cleared peripheral blasts early by day 3 of chemo. Fairly uncomplicated course except for Enterococcus Faecalis bacteremia and subsequent line removal.   End of induction BMA/ BX done on 4/23 showed hypocellular bone marrow (40% cellular) with maturing  trilineage hematopoiesis, left shifted granulopoiesis and 3% myeloblasts. The overall findings suggest a regenerating marrow. The myeloblasts do not show immunophenotypic atypia seen in the patient's leukemic blasts (aberrant CD7 expression) .  Of note, after obtaining her NGS panel results we reviewed her previous reports her initial disease at age 2 was negative for CEBPA and it was tested as she was part of the COG trial. We performed a skin bx to assess for germline CEBPA and results are pending.  Her unrelated bone marrow donor search has shown a suitable match yet. Mom is age 65 and Dad is 78, mom 6/12 dad 8/12 match. One of her half sisters (Rosaura age 46y)  is 7/12 match. Jaylan declined HSCT after discussion with BMT team regarding TRM.  They had also seeked outside opinions which also supported chemotherapy rather than HSCT.    5/5/21 - 5/7/21: HD ARAC, Neupogen 5/9-5/18 6/2/21- admission for HD ARAC/ GO    7/19/21: Admit for Cycle 3 HD ARAC (last cycle of chemo), discharged home on 8/9.     10/15/21: started maintenance chemotherapy with oral Azacitidine          Acute myeloid leukemia (CMS/HCC)   9/19/2023 Initial Diagnosis    Acute myeloid leukemia (CMS/HCC)          SUBJECTIVE:     History of Present Illness:  Reproted that she's continuing to get low grade fevers.  Noted that Friday was the best that she had felt in 2 months - was active, getting stuff done, but still had a 100.6 fever max, and then had a really high level of fatigue and was sleepy on Saturday.  Sunday still felt reasonably well.  Reported that she developed oral thrush, and the prior week, had a skin yeast infection on her lower abdomen.  No respiratory symptoms.     Noted that her psychiatrist had actually decreased her fluoxetine over the summary, and with the recent stress, she reported that her psychiatrist actually increased it again.     Waiting for mri results now - had it completed today, and set to see ENT later this  week.         Reviewed past medical, surgical and family history for updates  Reviewed Social history for updates.    Review of Systems   Constitutional:  Positive for fever.   HENT: Negative.     Eyes: Negative.    Respiratory: Negative.     Cardiovascular: Negative.    Gastrointestinal: Negative.    Endocrine: Negative.    Musculoskeletal: Negative.    Neurological: Negative.    Hematological: Negative.    Psychiatric/Behavioral: Negative.         Home Medication Adherence:     OBJECTIVE:    VS:  There were no vitals taken for this visit.  BSA: There is no height or weight on file to calculate BSA.    Physical Exam  Vitals reviewed: Virtual visit; no physical exam performed.         Laboratory:  Hospital Outpatient Visit on 03/12/2024   Component Date Value Ref Range Status    Tissue/Wound Culture/Smear 03/12/2024 No growth aerobically and anaerobically   Final    Gram Stain 03/12/2024 (3+) Moderate Polymorphonuclear leukocytes   Final    Gram Stain 03/12/2024 No organisms seen   Final    Fungal Culture/Smear 03/12/2024 Culture in progress, a report will be issued when positive or after 2 weeks of incubation.   Preliminary    Fungal Smear 03/12/2024 No fungal elements seen   Preliminary    AFB Culture 03/12/2024 Culture in progress and will be examined weekly. A result will be issued either when positive or after 8 weeks incubation.   Preliminary    AFB Stain 03/12/2024 No acid fast bacilli seen   Preliminary

## 2024-03-19 PROBLEM — D61.818 PANCYTOPENIA, ACQUIRED (MULTI): Status: RESOLVED | Noted: 2023-09-19 | Resolved: 2024-03-19

## 2024-03-19 PROBLEM — D70.9 NEUTROPENIC FEVER (CMS-HCC): Status: RESOLVED | Noted: 2023-09-19 | Resolved: 2024-03-19

## 2024-03-19 PROBLEM — T45.1X5A CHEMOTHERAPY-INDUCED NAUSEA: Status: RESOLVED | Noted: 2023-09-19 | Resolved: 2024-03-19

## 2024-03-19 PROBLEM — C92.90: Status: RESOLVED | Noted: 2023-09-19 | Resolved: 2024-03-19

## 2024-03-19 PROBLEM — R11.0 CHEMOTHERAPY-INDUCED NAUSEA: Status: RESOLVED | Noted: 2023-09-19 | Resolved: 2024-03-19

## 2024-03-19 PROBLEM — D64.9 ANEMIA: Status: RESOLVED | Noted: 2023-09-19 | Resolved: 2024-03-19

## 2024-03-19 PROBLEM — R50.81 NEUTROPENIC FEVER (CMS-HCC): Status: RESOLVED | Noted: 2023-09-19 | Resolved: 2024-03-19

## 2024-03-19 PROBLEM — D69.6 LOW PLATELET COUNT (CMS-HCC): Status: RESOLVED | Noted: 2023-09-19 | Resolved: 2024-03-19

## 2024-03-19 ASSESSMENT — ENCOUNTER SYMPTOMS
CARDIOVASCULAR NEGATIVE: 1
GASTROINTESTINAL NEGATIVE: 1
FEVER: 1
RESPIRATORY NEGATIVE: 1
PSYCHIATRIC NEGATIVE: 1
MUSCULOSKELETAL NEGATIVE: 1
EYES NEGATIVE: 1
HEMATOLOGIC/LYMPHATIC NEGATIVE: 1
NEUROLOGICAL NEGATIVE: 1
ENDOCRINE NEGATIVE: 1

## 2024-03-19 NOTE — ASSESSMENT & PLAN NOTE
3/18/2024: I reviewed results of her bone marrow biopsy - no evidence of relapsed or refractory disease.

## 2024-03-19 NOTE — ASSESSMENT & PLAN NOTE
3/18/2024 - I discussed that if current workup is negative, next consideration would be endocrine or rheumatologic - but will follow up pending exams.

## 2024-03-20 LAB
CHROM ANALY OVERALL INTERP-IMP: NORMAL
ELECTRONICALLY SIGNED BY CYTOGENETICS: NORMAL

## 2024-03-28 ENCOUNTER — TELEPHONE (OUTPATIENT)
Dept: ADMISSION | Facility: HOSPITAL | Age: 29
End: 2024-03-28
Payer: COMMERCIAL

## 2024-03-28 NOTE — TELEPHONE ENCOUNTER
Dr. Ana Leal, ENT in [place] called to speak with Dr. Baum's team  Pt was seen today and she would like to discuss findings from pt's nares.   Her call back cell is [phone]

## 2024-04-01 LAB
FUNGUS SPEC CULT: NORMAL
FUNGUS SPEC FUNGUS STN: NORMAL

## 2024-04-09 ENCOUNTER — TELEMEDICINE (OUTPATIENT)
Dept: HEMATOLOGY/ONCOLOGY | Facility: HOSPITAL | Age: 29
End: 2024-04-09
Payer: COMMERCIAL

## 2024-04-09 DIAGNOSIS — C92.01 ACUTE MYELOID LEUKEMIA IN REMISSION (MULTI): Primary | ICD-10-CM

## 2024-04-09 PROCEDURE — 99215 OFFICE O/P EST HI 40 MIN: CPT | Performed by: NURSE PRACTITIONER

## 2024-04-09 NOTE — PROGRESS NOTES
Patient ID: Jaylan Palmer is a 28 y.o. female.  Referring Physician: No referring provider defined for this encounter.  Primary Care Provider: Jazmine Howell MD      Subjective    Patient presents today by referral of Dr. Baum for young adult oncology consultation.     Physically patient relays feeling not well -- relays history of persistent fevers occurring daily with a temperature range of . Oral azacitidine is on hold while she undergoes testing to determine underlying etiology of fevers. Relays feeling fatigued not just due to persistent fevers, but medical fatigue from undergoing so much testing. She also notes ongoing issues with nausea -- using kytril, compazine, and lorazepam. Has increase in frequency of bowel pattern, multiple looser stools daily. Also notes decreased appetite/interest in food.     Emotionally, she relays struggling with the uncertainty of persistent fevers. Worrying about worst case scenario -- her cancer returning vs something else medically serious. Has had increase in anxiety and stress surrounding medical testing and lack of answers to her symptoms. Remains under the care of a psychiatrist in [place], she is currently taking prozac daily, trazodone prn for sleep, also on amitriptyline for headaches, tizanidine PRN spasms, and lorazepam PRN anxiety. In the past had engaged with therapy -- doesn't feel she needs to re-engage with this. Notes that her spouse and mother feel that she is doing worse emotionally than how she feels she is doing.     Denies issues with intimacy with her partner, feels well connected and supported to her partner and notes open communication about her feelings. Does relay issues with sexual dysfunction, vaginal dryness and pain with sex. Is using topical lubricant with sex with some relief. Has not tried other topical therapies. Does have gynecologist in [place] and is meeting with them in near future to repeat hormone testing. Not  currently preventing pregnancy, uses withdrawal method, was previously on ocp, then mirena IUD, had this removed in recent past d/t weight gain on IUD. Denies hot flashes, but wonders if her persistent fevers could be related to ovarian insufficiency.    Does desire future family and biologic children. Was able to undergo ovarian stimulation and egg retrieval prior to chemotherapy in 2021, per her she has 6 oocytes in storage. Has been communicating with providers to have understanding of when/if she will be able to hold her oral aza in order to do assisted reproductive technology. Planning to do this in the coming years -- difficult d/t husbands current schedule in med-school.    Still follows with Dr. Howell/[facility], also sees oncology primary care at [facility]. Is interested in understanding long term screening/late effects of her most recent treatment. Notes need to be more mindful about her general health, not eating best diet currently d/t GI symptoms, is active but does not exercise, notes weight gain, does use ETOH.     Social history: from [place], currently living in [place] with her  who is in Medical School there, has a masters in public health and PhD in health promotion, currently working in cancer survivorship/research at [employer]. Does drink alcohol 3-4 drinks/week, former tobacco smoked for 10 years, quit in 2021, no vaping, no recreational drug use.      Review of Systems   Constitutional:  Positive for appetite change, chills, fatigue, fever and unexpected weight change.   Endocrine: Negative for hot flashes.   Genitourinary:  Negative for pelvic pain and vaginal bleeding.    Psychiatric/Behavioral:  Negative for sleep disturbance. The patient is nervous/anxious.         Objective   BSA: There is no height or weight on file to calculate BSA.  There were no vitals taken for this visit.    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 She was apparently well for nearly 22 years until November of 2020 when she started complaining of multiple vague symptoms including fatigue, headaches, fainting, dizziness, generalized body pain, tiredness, bruising over the arms, petechiae, low grade fevers and heavy menses. CBC showed thrombocytopenia and peripheral circulating blasts concerning for relapse. She subsequently moved here for further treatment    Her exam showed multiple bruises over thighs, legs, arms and petechiae. 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 Lab work showed: CBC 12.9>9.6/30.9<26  ANC 3350  ALC 3230 Blast %44    UA 3.8  RFP: 139/3.7/107/23/15/0.57 Peripheral smear: circulating blasts with Agueda rods consistent with relapsed AML    BMA/Bx done which showed hypercellular marrow involved by 57% blasts consistent with AML.   Cytogenetics, FISH - negative for CBFB (16q22 rearrangement), KMT2A MLL gene rearrangement, p53, RUNX1, 7q- and 5q-  and molecular studies are unremarkable - FLT3 ITD and FLT3 TKD negative. 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 CSF- negative for blasts    Based on the above Jaylan did not have any of the high risk features. 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 We discussed her case at the time in both Adult and Pediatric tumor boards. She was offered to go on Phase III trial with uproleselan administered  with chemo vs chemo alone in relapsed/refractory AML. However after careful consideration, Jaylan declined the trial  Subsequently proceeded with idarubicin-FLAG regimen.  She had an ECHO, which showed normal EF69% normal cardiac function.  She has prior cum anthracycline dose of 340 mg/m2.  Dose reduced cecy from 10mg/m2 to 8mg/m2 and administered zinecard. She received her first course chemotherapy and tolerated it fairly well. No significant tumor lysis. Cleared peripheral blasts early by day 3 of chemo. Fairly uncomplicated course except for Enterococcus Faecalis bacteremia and subsequent line removal.   End of induction BMA/ BX done on 4/23 showed hypocellular bone marrow (40% cellular) with maturing trilineage hematopoiesis, left shifted granulopoiesis and 3% myeloblasts. The overall findings suggest a regenerating marrow. The myeloblasts do not show immunophenotypic atypia seen in the patient's leukemic blasts (aberrant CD7 expression) .  Of note, after obtaining her NGS panel results we reviewed her previous reports her initial disease at age 2 was negative for CEBPA and it was tested as she was part of the COG trial. We performed a skin bx to assess for germline CEBPA and results are pending.  Her unrelated bone marrow donor search has shown a suitable match yet. Mom is age 65 and Dad is 78, mom 6/12 dad 8/12 match. One of her half sisters (Rosaura age 46y)  is 7/12 match. Jaylan declined HSCT after discussion with BMT team regarding TRM.  They had also seeked outside opinions which also supported chemotherapy rather than HSCT.    5/5/21 - 5/7/21: HD ARAC, Neupogen 5/9-5/18 6/2/21- admission for HD ARAC/ GO    7/19/21: Admit for Cycle 3 HD ARAC (last cycle of chemo), discharged home on 8/9.     10/15/21: started maintenance chemotherapy with oral Azacitidine          Acute myeloid leukemia (CMS/HCC)   9/19/2023 Initial Diagnosis    Acute myeloid leukemia (CMS/HCC)       Jaylan Stallingscarley  reports that she  has never smoked. She has never used smokeless tobacco.  She  reports current alcohol use of about 2.0 standard drinks of alcohol per week.  She  reports no history of drug use.    Physical Exam  Constitutional:       General: She is not in acute distress.     Appearance: Normal appearance. She is not ill-appearing.   Neurological:      General: No focal deficit present.      Mental Status: She is alert and oriented to person, place, and time.   Psychiatric:         Mood and Affect: Mood normal.         Behavior: Behavior normal.         Thought Content: Thought content normal.         Judgment: Judgment normal.       Performance Status:  Asymptomatic    Assessment/Plan    Jaylan Palmer is a 28 y.o. F with hx of childhood AML with late recurrence in 2021 -- 22 years after her initial diagnosis, s/p reinduction 7+3 followed by HIDAC consolidation and continues on oral vidaza maintenance therapy.     #Psychosocial: young adult with history of cancer  - Aware of supportive resources  - Continue to follow with psychiatry, consider engaging with therapy again in future  - Will send social program invitations when available     #Ovarian insufficiency: per patient hormone testing at [facility] with gynecology  - Prior therapy does not put her at high risk for infertility but she should be monitored for ovarian insufficiency  - Recommend checking AMH  - Would consider HRT if serum estrogen low, FSH/LH elevated especially given her young age    #Sexual dysfunction: likely related to ovarian insufficiency  - Recommended use of topical vaginal moisturizer replens 1-2x daily as part of normal hygiene routine  - Continue use of topical lubricant with penetrative intercourse, prefer silicone based  - Consider topical vaginal estrogen cream  - May benefit from HRT as above    #Late effects/survivorship care:  - Follows with Dr. Howell -- will touch base with her regarding screening for late effects related to her more recent  therapy in [place]    #Diet/Exercise/Healthy Lifestyle:  - Will discuss at follow up; diet, exercise, weight loss/management, decreased ETOH     Follow up in 2-3 months               MARYANA Petty-CNP

## 2024-04-16 ASSESSMENT — ENCOUNTER SYMPTOMS
UNEXPECTED WEIGHT CHANGE: 1
APPETITE CHANGE: 1
CHILLS: 1
NERVOUS/ANXIOUS: 1
SLEEP DISTURBANCE: 0
HOT FLASHES: 0
FEVER: 1
FATIGUE: 1

## 2024-04-17 ENCOUNTER — SPECIALTY PHARMACY (OUTPATIENT)
Dept: PHARMACY | Facility: CLINIC | Age: 29
End: 2024-04-17

## 2024-05-01 LAB
ACID FAST STN SPEC: NORMAL
MYCOBACTERIUM SPEC CULT: NORMAL

## 2024-05-14 ENCOUNTER — TELEPHONE (OUTPATIENT)
Dept: HEMATOLOGY/ONCOLOGY | Facility: HOSPITAL | Age: 29
End: 2024-05-14
Payer: COMMERCIAL

## 2024-05-14 NOTE — TELEPHONE ENCOUNTER
We connected briefly RE: her ongoing fevers, ongoing workups.  Trying to see a dysautonomia specialist right.  Had a 2nd opinion visit @ [facility] who recommend possible NGS.  I am going to see if we can get it run on the 3/12 marrow (if the DNA is still in storage), as if there are detectable clones would at minimum suggest a CHIP diagnosis.  She will also get an interval CBC as well - as monitoring this makes sense given the unexplained fevers.

## 2024-06-14 ENCOUNTER — APPOINTMENT (OUTPATIENT)
Dept: HEMATOLOGY/ONCOLOGY | Facility: HOSPITAL | Age: 29
End: 2024-06-14
Payer: COMMERCIAL

## 2024-06-17 ENCOUNTER — APPOINTMENT (OUTPATIENT)
Dept: HEMATOLOGY/ONCOLOGY | Facility: HOSPITAL | Age: 29
End: 2024-06-17
Payer: COMMERCIAL

## 2025-03-03 ENCOUNTER — APPOINTMENT (OUTPATIENT)
Dept: HEMATOLOGY/ONCOLOGY | Facility: HOSPITAL | Age: 30
End: 2025-03-03
Payer: COMMERCIAL

## 2025-03-06 ENCOUNTER — HOSPITAL ENCOUNTER (OUTPATIENT)
Dept: PEDIATRIC HEMATOLOGY/ONCOLOGY | Facility: HOSPITAL | Age: 30
Discharge: HOME | End: 2025-03-06
Payer: COMMERCIAL

## 2025-03-06 VITALS
TEMPERATURE: 99.5 F | HEIGHT: 67 IN | RESPIRATION RATE: 19 BRPM | BODY MASS INDEX: 33.18 KG/M2 | HEART RATE: 91 BPM | DIASTOLIC BLOOD PRESSURE: 70 MMHG | SYSTOLIC BLOOD PRESSURE: 127 MMHG | WEIGHT: 211.42 LBS

## 2025-03-06 DIAGNOSIS — C92.00 ACUTE MYELOID LEUKEMIA NOT HAVING ACHIEVED REMISSION (MULTI): Primary | ICD-10-CM

## 2025-03-06 LAB
ALBUMIN SERPL BCP-MCNC: 5 G/DL (ref 3.4–5)
ALP SERPL-CCNC: 42 U/L (ref 33–110)
ALT SERPL W P-5'-P-CCNC: 21 U/L (ref 7–45)
ANION GAP SERPL CALC-SCNC: 15 MMOL/L (ref 10–20)
AST SERPL W P-5'-P-CCNC: 14 U/L (ref 9–39)
BASOPHILS # BLD AUTO: 0.02 X10*3/UL (ref 0–0.1)
BASOPHILS NFR BLD AUTO: 0.3 %
BILIRUB SERPL-MCNC: 0.7 MG/DL (ref 0–1.2)
BUN SERPL-MCNC: 10 MG/DL (ref 6–23)
CALCIUM SERPL-MCNC: 9.7 MG/DL (ref 8.6–10.6)
CHLORIDE SERPL-SCNC: 104 MMOL/L (ref 98–107)
CO2 SERPL-SCNC: 24 MMOL/L (ref 21–32)
CREAT SERPL-MCNC: 0.75 MG/DL (ref 0.5–1.05)
EGFRCR SERPLBLD CKD-EPI 2021: >90 ML/MIN/1.73M*2
EOSINOPHIL # BLD AUTO: 0.07 X10*3/UL (ref 0–0.7)
EOSINOPHIL NFR BLD AUTO: 1.1 %
ERYTHROCYTE [DISTWIDTH] IN BLOOD BY AUTOMATED COUNT: 13 % (ref 11.5–14.5)
FERRITIN SERPL-MCNC: 1213 NG/ML (ref 8–150)
GLUCOSE SERPL-MCNC: 90 MG/DL (ref 74–99)
HCT VFR BLD AUTO: 38.5 % (ref 36–46)
HGB BLD-MCNC: 12.8 G/DL (ref 12–16)
IGA SERPL-MCNC: 65 MG/DL (ref 70–400)
IGG SERPL-MCNC: 678 MG/DL (ref 700–1600)
IGM SERPL-MCNC: 50 MG/DL (ref 40–230)
IMM GRANULOCYTES # BLD AUTO: 0.04 X10*3/UL (ref 0–0.7)
IMM GRANULOCYTES NFR BLD AUTO: 0.6 % (ref 0–0.9)
IRON SATN MFR SERPL: 36 % (ref 25–45)
IRON SERPL-MCNC: 133 UG/DL (ref 35–150)
LYMPHOCYTES # BLD AUTO: 2.22 X10*3/UL (ref 1.2–4.8)
LYMPHOCYTES NFR BLD AUTO: 34.8 %
MCH RBC QN AUTO: 27.7 PG (ref 26–34)
MCHC RBC AUTO-ENTMCNC: 33.2 G/DL (ref 32–36)
MCV RBC AUTO: 83 FL (ref 80–100)
MONOCYTES # BLD AUTO: 0.38 X10*3/UL (ref 0.1–1)
MONOCYTES NFR BLD AUTO: 6 %
NEUTROPHILS # BLD AUTO: 3.65 X10*3/UL (ref 1.2–7.7)
NEUTROPHILS NFR BLD AUTO: 57.2 %
NRBC BLD-RTO: 0 /100 WBCS (ref 0–0)
PLATELET # BLD AUTO: 247 X10*3/UL (ref 150–450)
POTASSIUM SERPL-SCNC: 4 MMOL/L (ref 3.5–5.3)
PROT SERPL-MCNC: 7.2 G/DL (ref 6.4–8.2)
RBC # BLD AUTO: 4.62 X10*6/UL (ref 4–5.2)
SODIUM SERPL-SCNC: 139 MMOL/L (ref 136–145)
TIBC SERPL-MCNC: 365 UG/DL (ref 240–445)
UIBC SERPL-MCNC: 232 UG/DL (ref 110–370)
WBC # BLD AUTO: 6.4 X10*3/UL (ref 4.4–11.3)

## 2025-03-06 PROCEDURE — 83540 ASSAY OF IRON: CPT | Performed by: NURSE PRACTITIONER

## 2025-03-06 PROCEDURE — 82784 ASSAY IGA/IGD/IGG/IGM EACH: CPT | Performed by: NURSE PRACTITIONER

## 2025-03-06 PROCEDURE — 99215 OFFICE O/P EST HI 40 MIN: CPT | Performed by: PEDIATRICS

## 2025-03-06 PROCEDURE — 36415 COLL VENOUS BLD VENIPUNCTURE: CPT | Performed by: NURSE PRACTITIONER

## 2025-03-06 PROCEDURE — 83550 IRON BINDING TEST: CPT | Performed by: NURSE PRACTITIONER

## 2025-03-06 PROCEDURE — 85025 COMPLETE CBC W/AUTO DIFF WBC: CPT | Performed by: NURSE PRACTITIONER

## 2025-03-06 PROCEDURE — 84075 ASSAY ALKALINE PHOSPHATASE: CPT | Performed by: NURSE PRACTITIONER

## 2025-03-06 PROCEDURE — 82728 ASSAY OF FERRITIN: CPT | Performed by: NURSE PRACTITIONER

## 2025-03-06 RX ORDER — BUPROPION HYDROCHLORIDE 150 MG/1
150 TABLET ORAL DAILY
COMMUNITY

## 2025-03-06 ASSESSMENT — PAIN SCALES - GENERAL: PAINLEVEL_OUTOF10: 4

## 2025-03-06 ASSESSMENT — ENCOUNTER SYMPTOMS
DEPRESSION: 0
LOSS OF SENSATION IN FEET: 0
OCCASIONAL FEELINGS OF UNSTEADINESS: 0

## 2025-03-06 NOTE — PROGRESS NOTES
Patient ID: Jaylan Palmer is a 29 y.o. female.  Referring Physician: No referring provider defined for this encounter.  Primary Care Provider: Jazmine Howell MD    Date of Service:  3/6/2025    SUBJECTIVE:    History of Present Illness:  Jaylan Palmer is a 29 y.o. female who was referred by No ref. provider found and presents with ***.  Jaylan is doing well, she is in town for a conference at [employer].  She is concerned that she maybe relapsing.  Recently ,  is taking step 1 in a month.  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    Dog is doing well.   Still having fevers, Baseline Temp 200  Temperature is mildly elevated in clinic today 99.5 100.3 baseline,  is sweaty.  Has brighter flush, fatigue, mailaise and body aches.  Skin was hurting with fever last week.  Other weeks she is feeling well, and does too much and maybe wears herself out.    Does a lot of traveling for work march-June.    Was diagnosed with Yovany Danlos and Pots after extensive work up for fevers.  Had severe abdominal pain in January, CT scan was done then.  Since that time she has started wgovy, with great improvement in her IBS.  Headaches are improved.    Has not had any major or recent Illness.    No trouble swallowing.  Denies Shortness of breath, Dyspnea on exertion, Orthopnea, Chest pain, Palpitations  Cognitive, motor and/or sensory deficits no Seizures, headaches or other neurologic symptoms  Periods are regular.  Having some lower back pain over the last few days.  Has been more achey.    No rashes, easy brusing, or bleeding.      Oncology History:    Oncology History Overview Note   Jaylan is 25yo young lady, PHD, with a history of AML age 2y, diagnosed and treated here at [facility] per clinical trial [id number] (dauno, Cytarabine, etopo). She was apparently well for nearly 22 years until November of 2020 when she started complaining of multiple vague symptoms including fatigue,  headaches, fainting, dizziness, generalized body pain, tiredness, bruising over the arms, petechiae, low grade fevers and heavy menses. CBC showed thrombocytopenia and peripheral circulating blasts concerning for relapse. She subsequently moved here for further treatment    Her exam showed multiple bruises over thighs, legs, arms and petechiae. 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 Lab work showed: CBC 12.9>9.6/30.9<26  ANC 3350  ALC 3230 Blast %44    UA 3.8  RFP: 139/3.7/107/23/15/0.57 Peripheral smear: circulating blasts with Agueda rods consistent with relapsed AML    BMA/Bx done which showed hypercellular marrow involved by 57% blasts consistent with AML.   Cytogenetics, FISH - negative for CBFB (16q22 rearrangement), KMT2A MLL gene rearrangement, p53, RUNX1, 7q- and 5q-  and molecular studies are unremarkable - FLT3 ITD and FLT3 TKD negative. 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 CSF- negative for blasts    Based on the above Jaylan did not have any of the high risk features. 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 We discussed her case at the time in both Adult and Pediatric tumor boards. She was offered to go on Phase III trial with uproleselan administered with chemo vs chemo alone in relapsed/refractory AML. However after careful consideration, Jaylan declined the trial  Subsequently proceeded with idarubicin-FLAG regimen.  She had an ECHO, which showed normal EF69% normal cardiac function.  She has prior cum anthracycline dose of 340 mg/m2.  Dose reduced cecy from 10mg/m2 to 8mg/m2 and administered zinecard. She received her first course chemotherapy and tolerated it fairly well. No significant tumor lysis. Cleared peripheral blasts early by day 3 of chemo. Fairly uncomplicated course except for Enterococcus Faecalis  bacteremia and subsequent line removal.   End of induction BMA/ BX done on 4/23 showed hypocellular bone marrow (40% cellular) with maturing trilineage hematopoiesis, left shifted granulopoiesis and 3% myeloblasts. The overall findings suggest a regenerating marrow. The myeloblasts do not show immunophenotypic atypia seen in the patient's leukemic blasts (aberrant CD7 expression) .  Of note, after obtaining her NGS panel results we reviewed her previous reports her initial disease at age 2 was negative for CEBPA and it was tested as she was part of the [facility] trial. We performed a skin bx to assess for germline CEBPA and results are pending.  Her unrelated bone marrow donor search has shown a suitable match yet. Mom is age 65 and Dad is 78, mom 6/12 dad 8/12 match. One of her half sisters (Rosaura age 46y)  is 7/12 match. Jaylan declined HSCT after discussion with BMT team regarding TRM.  They had also seeked outside opinions which also supported chemotherapy rather than HSCT.    5/5/21 - 5/7/21: HD ARAC, Neupogen 5/9-5/18 6/2/21- admission for HD ARAC/ GO    7/19/21: Admit for Cycle 3 HD ARAC (last cycle of chemo), discharged home on 8/9.     10/15/21: started maintenance chemotherapy with oral Azacitidine          Acute myeloid leukemia (Multi)   9/19/2023 Initial Diagnosis    Acute myeloid leukemia (CMS/HCC)         Past Medical History: Jaylan has a past medical history of Acute myeloblastic leukemia, not having achieved remission (Multi) (11/03/2021), Personal history of other diseases of the circulatory system, Personal history of other diseases of the digestive system, Personal history of other diseases of the female genital tract (01/11/2015), Personal history of other diseases of the nervous system and sense organs, Personal history of other diseases of urinary system, Personal history of other mental and behavioral disorders, Personal history of other specified conditions (01/11/2015), and Polyp of corpus  "uteri.    Surgical History:  Jaylan has a past surgical history that includes Tonsillectomy (01/08/2015) and Other surgical history (01/08/2015).    Social History:  Jaylan reports that she has never smoked. She has never used smokeless tobacco. She reports current alcohol use of about 2.0 standard drinks of alcohol per week. She reports that she does not use drugs.    Family History:    Family History   Problem Relation Name Age of Onset   • No Known Problems Mother     • Coronary artery disease Father     • Hypertension Father     • Diabetes Maternal Grandmother     • Coronary artery disease Maternal Grandfather     • Hypertension Maternal Grandfather     • Asthma Other         Review of Systems - Oncology    OBJECTIVE:    VS:  /70 (BP Location: Right arm, Patient Position: Sitting, BP Cuff Size: Large adult)   Pulse 91   Temp 37.5 °C (99.5 °F) (Oral)   Resp 19   Ht 1.695 m (5' 6.73\")   Wt 95.9 kg (211 lb 6.7 oz)   BMI 33.38 kg/m²   BSA: 2.12 meters squared  Pain:       Physical Exam    Performance Status:       Laboratory:  The pertinent laboratory results were reviewed and discussed with the patient.  Notably, Last CBC w. Diff.:    Lab Results   Component Value Date/Time    WBC 7.9 03/07/2024 1330    NRBC 0.0 03/07/2024 1330    RBC 4.36 03/07/2024 1330    HGB 12.0 03/07/2024 1330    HCT 36.6 03/07/2024 1330    MCV 84 03/07/2024 1330    MCH 27.5 03/07/2024 1330    MCHC 32.8 03/07/2024 1330    RDW 13.2 03/07/2024 1330     03/07/2024 1330    NEUTOPHILPCT 63.2 03/07/2024 1330    IGPCT 0.6 03/07/2024 1330    LYMPHOPCT 29.3 03/07/2024 1330    MONOPCT 5.0 03/07/2024 1330    EOSPCT 1.6 03/07/2024 1330    BASOPCT 0.3 03/07/2024 1330    NEUTROABS 5.01 03/07/2024 1330    IGABSOL 0.05 03/07/2024 1330    LYMPHSABS 2.32 03/07/2024 1330    MONOSABS 0.40 03/07/2024 1330    EOSABS 0.13 03/07/2024 1330    BASOSABS 0.02 03/07/2024 1330     Last RFP:    Lab Results   Component Value Date/Time    GLUCOSE 86 " 03/07/2024 1330     03/07/2024 1330    K 4.1 03/07/2024 1330     03/07/2024 1330    CO2 21 03/07/2024 1330    ANIONGAP 16 03/07/2024 1330    BUN 11 03/07/2024 1330    CREATININE 0.66 03/07/2024 1330    EGFR >90 03/07/2024 1330    CALCIUM 10.3 03/07/2024 1330    PHOS 3.8 03/07/2024 1330    ALBUMIN 4.8 03/07/2024 1330     Last HFP:    Lab Results   Component Value Date/Time    ALBUMIN 4.8 03/07/2024 1330    BILITOT 0.4 03/07/2024 1330    BILIDIR 0.0 03/07/2024 1330    ALKPHOS 40 03/07/2024 1330    ALT 39 03/07/2024 1330    AST 20 03/07/2024 1330    PROT 7.2 03/07/2024 1330   .    Pathology:  The pertinent pathology results were reviewed and discussed with the patient.  Notably, ***.    Imaging:  [facility] CT Abdomen Pelvis w IV contrast  The pertinent imaging results were reviewed and discussed with the patient.  Notably, No bowel or urinary tract obstruction. No free air or free fluid.     Common mesenteric lymph nodes, nonspecific. Consider mesenteric adenitis.     Cholelithiasis.     Nonobstructing right nephrolithiasis. .  Chest Xray 11/208/2024  Findings: There is no evidence for active cardiovascular or pulmonary disease.   The heart size is within normal limits and the lung fields are clear.   Results for orders placed in visit on 05/03/21    Onco-Cardiology Echo    [facility], [address] [place] [address]  Tel [phone] and Fax [phone]    TRANSTHORACIC ECHOCARDIOGRAM REPORT      Patient Name:     RAFFILILI SANTA Reading Physician:   [id number] Ness Briceno MD  Study Date:       5/3/2021          Referring Physician: MARISSA DIAZ  MRN/PID:          [id number]          PCP:  Accession/Order#: [id number]         Department Location: [facility]  YOB: 1995         Fellow:  Gender:           F                 Nurse:  Admit Date:                         Sonographer:         Ugo Schrader [facility]  Admission Status: Outpatient         Additional Staff:  Height:           167.64 cm         CC Report to:  Weight:           97.98 kg          Study Type:          Onco-Cardiology Echo  BSA:              2.07 m2  Blood Pressure: 110 /85 mmHg    Diagnosis/ICD: Z92.21-Personal history of antineoplastic chemotherapy  Indication:    history of anthracycline  Procedure/CPT: Echo Complete w Full Doppler-83831; 3D Rendering w/o independent  workstation-[id number]; Myocardial Strain Imaging-86835    Patient History:  Pertinent History: Former smoker; childhood AML.    Study Detail: The following Echo studies were performed: 2D, M-Mode, Doppler and  color flow. Technically challenging study due to body habitus.      PHYSICIAN INTERPRETATION:  Left Ventricle: The left ventricular systolic function is normal, with an estimated ejection fraction of 60-65%. There are no regional wall motion abnormalities. The left ventricular cavity size is normal. The left ventricular septal wall thickness is mildly increased. Spectral Doppler shows a normal pattern of left ventricular diastolic filling. Strain values are normal, which imply normal myocardial function.  Left Atrium: The left atrium is normal in size.  Right Ventricle: The right ventricle is normal in size. There is normal right ventricular global systolic function.  Right Atrium: The right atrium is normal in size.  Aortic Valve: The aortic valve is trileaflet. There is no evidence of aortic valve regurgitation. The peak instantaneous gradient of the aortic valve is 6.7 mmHg.  Mitral Valve: The mitral valve is normal in structure. There is trace mitral valve regurgitation.  Tricuspid Valve: The tricuspid valve is structurally normal. There is trace tricuspid regurgitation. The Doppler estimated RVSP is within normal limits at 23.3 mmHg.  Pulmonic Valve: The pulmonic valve is structurally normal. There is physiologic pulmonic valve regurgitation.  Pericardium: There is no pericardial effusion noted.  Aorta: The aortic  root is normal.  Systemic Veins: The inferior vena cava appears to be of normal size. There is IVC inspiratory collapse greater than 50%.  In comparison to the previous echocardiogram(s): Compared with the prior exam from 3/23/2021 the GLS remains normal, however has decreased from -22.4% to -20.5%. In addition the previously hyperdynamic LV ( LVEF 69%) is now normal with visually estimated LVEF of 60-65%.    ONCO-CARDIOLOGY:  Machine: This study was performed on the [employer].      Onco-Cardiology Measurements:  Current Measurements  2D EF (Biplane)                     59%  3D EF                               58%  Global Longitudinal Strain (GLS) -20.5%    GLS Tracking Quality: Good      CONCLUSIONS:  1. The left ventricular systolic function is normal with a 60-65% estimated ejection fraction.  2. RVSP within normal limits.  3. Compared with the prior exam from 3/23/2021 the GLS remains normal, however has decreased from -22.4% to -20.5%. In addition the previously hyperdynamic LV ( LVEF 69%) is now normal with visually estimated LVEF of 60-65%.  4. Strain values are normal, which imply normal myocardial function.    QUANTITATIVE DATA SUMMARY:  2D MEASUREMENTS:  Normal Ranges:  Ao Root d:     3.10 cm   (2.0-3.7cm)  LAs:           4.13 cm   (2.7-4.0cm)  IVSd:          1.33 cm   (0.6-1.1cm)  LVPWd:         0.90 cm   (0.6-1.1cm)  LVIDd:         4.58 cm   (3.9-5.9cm)  LVIDs:         2.75 cm  LV Mass Index: 89.2 g/m2  LV % FS        40.0 %    LA VOLUME:  Normal Ranges:  LA Vol A4C:       54.8 ml    (22+/-6mL/m2)  LA Vol A2C:       48.7 ml  LA Vol BP:        52.5 ml  LA Vol Index A4C: 26.5 ml/m2  LA Vol Index A2C: 23.6 ml/m2  LA Vol Index BP:  25.4 ml/m2  LA Volume Index:  25.4 ml/m2  LA Vol A4C:       52.5 ml  LA Vol A2C:       46.2 ml    RA VOLUME BY A/L METHOD:  Normal Ranges:  RA Area A4C: 9.0 cm2    AORTA MEASUREMENTS:  Normal Ranges:  Ao Sinus, d: 3.00 cm (2.1-3.5cm)  Asc Ao, d:   2.90 cm (2.1-3.4cm)    LV SYSTOLIC  FUNCTION BY 2D PLANIMETRY (MOD):  Normal Ranges:  EF-A4C View: 56.4 % (>55%)  EF-A2C View: 59.7 %  EF-Biplane:  58.6 %    LV DIASTOLIC FUNCTION:  Normal Ranges:  MV Peak E:        0.74 m/s    (0.7-1.2 m/s)  MV Peak A:        0.59 m/s    (0.42-0.7 m/s)  E/A Ratio:        1.25        (1.0-2.2)  MV e'             0.10 m/s    (>8.0)  MV A Dur:         128.03 msec  E/e' Ratio:       7.38        (<8.0)  MV DT:            210 msec    (150-240 msec)  PulmV Sys Hussein:    56.57 cm/s  PulmV Ruiz Hussein:   64.99 cm/s  PulmV S/D Hussein:    0.87  PulmV A Revs Hussein: 36.01 cm/s  PulmV A Revs Dur: 121.11 msec    AORTIC VALVE:  Normal Ranges:  AoV Vmax:      1.29 m/s (<1.7m/s)  AoV Peak P.7 mmHg (<20mmHg)  LVOT Max Hussein:  1.33 m/s (<1.1m/s)  LVOT VTI:      22.98 cm  LVOT Diameter: 2.39 cm  (1.8-2.4cm)  AoV Area,Vmax: 4.59 cm2 (2.5-4.5cm2)    RIGHT VENTRICLE:  RV 1   3.4 cm  RV 2   2.4 cm  RV 3   5.4 cm  TAPSE: 23.0 mm  RV s'  0.13 m/s    TRICUSPID VALVE/RVSP:  Normal Ranges:  Peak TR Velocity: 2.26 m/s  RV Syst Pressure: 23.3 mmHg (< 30mmHg)  IVC Diam:         1.30 cm    PULMONIC VALVE:  Normal Ranges:  PV Max Hussein: 1.1 m/s  (0.6-0.9m/s)  PV Max P.5 mmHg    Pulmonary Veins:  PulmV A Revs Dur: 121.11 msec  PulmV A Revs Hussein: 36.01 cm/s  PulmV Ruiz Hussein:   64.99 cm/s  PulmV S/D Hussein:    0.87  PulmV Sys Hussein:    56.57 cm/s      Kerwin Briceno MD  Electronically signed on 2021 at 6:27:19 PM        *** Final ***     ASSESSMENT and PLAN:    No matching staging information was found for the patient.  {Assess/Plan SmartLinks (Optional):91939}     Treatment Plan:  [No matching plan found]    {TIP  Telehealth Consent - Complete the below for Telehealth Visits:88966}  {Telehealth Consent - Adult/Pediatric:84148}         {Attestation List for Teaching Physicians:37826}    Christian Turner, APRN-CNP     well as new liver lesion seen on imaging not tender, LFTs normal. She is off of the OCPs due to the liver lesion. She is currently on maintenance chemotherapy with oral Azacitadine managed by adult oncology which was held in October around the time of her wedding and also for almost 2 months due to persistent temps in the  range of unclear etiology.      Etiology of these fevers in not clear yet.  It could be due to her overall lowered immunity (IgG and IgA are mildly low) and she has had a few viral exposures.  She has taken home COVID tests which were negative.  Flu and COVID were negative today.  RAP panel from today is pending. Strep negative. CMV, aspergillus, histo, HIV, quantiferon, Cts Chest/abdomen/pelvis negative.   EBV PCR pending. Sinuses possible source of fever.  Less likely to have invasive fungal infection based on negative markers and CT exam also, less likely to occur on azacitadine.  Has been off of acyclovir recently for about 4 months but doesn't have signs or symptoms of HSV.     Malignancy can cause fevers but no obvious evidence based on scans, or inflammatory markers.  CBC with diff has been normal.  However, will obtain Bone marrow testing through adult oncology to rule out occult malignancy.     Autoimmune causes can trigger fever.,  YNES and RF were tested recently and were negative.      Review of her medications makes drug fever less likely. No new changes in medications recently.     Plan:    Oncology:   - 29 year old female with relapsed AML s/p cycle 1 FLAG-HIRO in March 2021 and x1 cycle of HD-ARAC and 1x HD Arac + GO. Bone marrow biopsy on 4/23 showed morpholoical remission and no evidence of MRD based on flow. Jaylan is now s/p 3 cycles of Consolidation chemo with HD ARAC (3gm/m2/dose).   - Did not undergo BMT given higher risk for morbidity due to underlying medical issues (please refer to note from Dr Rodríguez BMT physician)  -Jaylan had Bone marrow bx and aspirate done on  8/13/2024. Results of BMBX were negative for MRD  -Jaylan spoke with Dr. Baum in adult Heme/Onc re: vaccine trial. Not eligible for trial currently.  -  Completed Azacitidine for maintenance chemotherapy.      Pulmonary:   Previously had reduced PFT results, currently arranging f/u appointment with Pulmonary Service. Repeat PFT scheduled for 10/4/24, met with Pulmonology. Improved DLCO currently considered in the normal range.     Cardiac:   - Follows with onco-cardiology at .  Continue metoprolol.     Neuro:  neuropathy-we prescribed capsaicin at list visit but she hasn't used it yet.   Has neurologist at   Headaches: - Chemotherapy induced headaches. Follows with nuerology     FENGI:  -Jaylan has a history of multiple GI complaints/ IBS. Peds GI recommended Dr. Narinder Zee/ Dr. Maicol Titus on the adult side, shared this with Jaylan. Is having constipation this cycle.  Will increase miralax and can also use senna/colace.    - Kytril working to control nausea, scripts sent for ativan and compazine today  -s/p  MRI of liver 7/13 and saw hepatology at   on 7/11. She will continue to follow with that team.            ID:  -No longer requires PCP ppx  -Immunized in fall 2023 for COVID booster and flu  -Jaylan would like to talk to adult oncology to see if need to continue or if she can dose modify acyclovir.   Labs sent today: candida ab (pending) extended rap (pending) , covid/flu/strep-negative ebv pcr (pending), urine analysis normal today.  CRP was normal.  ESR mild elevation at 22.   -Rechecked YNES today and IgGAM.    -Would recommend ENT and ID consultations     Reproductive Endo:  Was seen by gyn March 2022, seen in [place] on 8/22  -recent AMH in normal range at   -hasn't resumed regular menses yet  -using condoms for protection while on chemo  -Jaylan will talk to gyn considering copper IUD     Immune:    Saw immunology at  previously           RTC: Will come back for follow up with us in 3 mos for  surviourship review          Christian Turner, APRN-CNP

## 2025-03-07 NOTE — PROGRESS NOTES
Massage Therapy / Acupuncture Note:  I visited with Alique and Mom today.  Both were in good spirits.  I will continue to check in.

## 2025-04-22 NOTE — PROGRESS NOTES
Patient ID: Jaylan Palmer is a 29 y.o. female.  Referring Physician: Christian Turner, APRN-CNP  [id number] [place] [address]  Primary Care Provider: Jazmine Howell MD    Date of Service:  5/21/2025    SUBJECTIVE:    History of Present Illness:  Jaylan Palmer is a 29 y.o. female who was referred by Christian Turner, * and presents with ***.  HPI  Oncology History:    Oncology History Overview Note   Jaylan is 25yo young lady, PHD, with a history of AML age 2y, diagnosed and treated here at [facility] per clinical trial [id number] (dauno, Cytarabine, etopo). She was apparently well for nearly 22 years until November of 2020 when she started complaining of multiple vague symptoms including fatigue, headaches, fainting, dizziness, generalized body pain, tiredness, bruising over the arms, petechiae, low grade fevers and heavy menses. CBC showed thrombocytopenia and peripheral circulating blasts concerning for relapse. She subsequently moved here for further treatment    Her exam showed multiple bruises over thighs, legs, arms and petechiae. 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 Lab work showed: CBC 12.9>9.6/30.9<26  ANC 3350  ALC 3230 Blast %44    UA 3.8  RFP: 139/3.7/107/23/15/0.57 Peripheral smear: circulating blasts with Agueda rods consistent with relapsed AML    BMA/Bx done which showed hypercellular marrow involved by 57% blasts consistent with AML.   Cytogenetics, FISH - negative for CBFB (16q22 rearrangement), KMT2A MLL gene rearrangement, p53, RUNX1, 7q- and 5q-  and molecular studies are unremarkable - FLT3 ITD and FLT3 TKD negative. 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 CSF- negative for blasts    Based on the above Jaylan did not have any of the high risk features. 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 We discussed her case at the time in both Adult and Pediatric tumor boards. She was offered to go on Phase III trial with uproleselan administered with chemo vs chemo alone in relapsed/refractory AML. However after careful consideration, Jaylan declined the trial  Subsequently proceeded with idarubicin-FLAG regimen.  She had an ECHO, which showed normal EF69% normal cardiac function.  She has prior cum anthracycline dose of 340 mg/m2.  Dose reduced cecy from 10mg/m2 to 8mg/m2 and administered zinecard. She received her first course chemotherapy and tolerated it fairly well. No significant tumor lysis. Cleared peripheral blasts early by day 3 of chemo. Fairly uncomplicated course except for Enterococcus Faecalis bacteremia and subsequent line removal.   End of induction BMA/ BX done on 4/23 showed hypocellular bone marrow (40% cellular) with maturing trilineage hematopoiesis, left shifted granulopoiesis and 3% myeloblasts. The overall findings suggest a regenerating marrow. The myeloblasts do not show immunophenotypic atypia seen in the patient's leukemic blasts (aberrant CD7 expression) .  Of note, after obtaining her NGS panel results we reviewed her previous reports her initial disease at age 2 was negative for CEBPA and it was tested as she was part of the [facility] trial. We performed a skin bx to assess for germline CEBPA and results are pending.  Her unrelated bone marrow donor search has shown a suitable match yet. Mom is age 65 and Dad is 78, mom 6/12 dad 8/12 match. One of her half sisters (Rosaura age 46y)  is 7/12 match. Jaylan declined HSCT after discussion with BMT team regarding TRM.  They had also seeked outside opinions which also supported chemotherapy rather than HSCT.    5/5/21 - 5/7/21: HD ARAC, Neupogen 5/9-5/18 6/2/21- admission for HD ARAC/ GO    7/19/21: Admit for Cycle 3 HD ARAC (last cycle of chemo), discharged home on 8/9.     10/15/21: started maintenance chemotherapy with oral  Azacitidine          Acute myeloid leukemia (Multi)   9/19/2023 Initial Diagnosis    Acute myeloid leukemia (CMS/HCC)         Past Medical History: Jaylan has a past medical history of Acute myeloblastic leukemia, not having achieved remission (Multi) (11/03/2021), Personal history of other diseases of the circulatory system, Personal history of other diseases of the digestive system, Personal history of other diseases of the female genital tract (01/11/2015), Personal history of other diseases of the nervous system and sense organs, Personal history of other diseases of urinary system, Personal history of other mental and behavioral disorders, Personal history of other specified conditions (01/11/2015), and Polyp of corpus uteri.    Surgical History:  Jaylan has a past surgical history that includes Tonsillectomy (01/08/2015) and Other surgical history (01/08/2015).    Social History:  Jaylan reports that she has never smoked. She has never used smokeless tobacco. She reports current alcohol use of about 2.0 standard drinks of alcohol per week. She reports that she does not use drugs.    Family History:  Family History[1]    Review of Systems - Oncology    OBJECTIVE:    VS:  There were no vitals taken for this visit.  BSA: There is no height or weight on file to calculate BSA.  Pain:       Physical Exam    Performance Status:       Laboratory:  The pertinent laboratory results were reviewed and discussed with the patient.  Notably, {PED ONCOLOGY LAB RESULTS:32601}.    Pathology:  The pertinent pathology results were reviewed and discussed with the patient.  Notably, ***.    Imaging:  The pertinent imaging results were reviewed and discussed with the patient.  Notably, ***.    ASSESSMENT and PLAN:    No matching staging information was found for the patient.  {Assess/Plan SmartLinks (Optional):85681}     Treatment Plan:  [No matching plan found]    {TIP  Telehealth Consent - Complete the below for Telehealth  Visits:75227}  {Telehealth Consent - Adult/Pediatric:38435}         {Attestation List for Teaching Physicians:42854}    LEONEL Ham         [1]   Family History  Problem Relation Name Age of Onset    No Known Problems Mother      Coronary artery disease Father      Hypertension Father      Diabetes Maternal Grandmother      Coronary artery disease Maternal Grandfather      Hypertension Maternal Grandfather      Asthma Other

## 2025-05-21 ENCOUNTER — HOSPITAL ENCOUNTER (OUTPATIENT)
Dept: PEDIATRIC HEMATOLOGY/ONCOLOGY | Facility: HOSPITAL | Age: 30
Discharge: HOME | End: 2025-05-21
Payer: COMMERCIAL

## 2025-05-21 VITALS
HEIGHT: 67 IN | BODY MASS INDEX: 31.63 KG/M2 | DIASTOLIC BLOOD PRESSURE: 87 MMHG | SYSTOLIC BLOOD PRESSURE: 123 MMHG | TEMPERATURE: 98.1 F | HEART RATE: 90 BPM | WEIGHT: 201.5 LBS | RESPIRATION RATE: 20 BRPM

## 2025-05-21 DIAGNOSIS — Z13.31 NEGATIVE DEPRESSION SCREENING: ICD-10-CM

## 2025-05-21 DIAGNOSIS — C92.00 ACUTE MYELOID LEUKEMIA NOT HAVING ACHIEVED REMISSION (MULTI): ICD-10-CM

## 2025-05-21 DIAGNOSIS — C92.01 ACUTE MYELOID LEUKEMIA IN REMISSION (MULTI): Primary | ICD-10-CM

## 2025-05-21 DIAGNOSIS — F41.9 ANXIETY: Primary | ICD-10-CM

## 2025-05-21 DIAGNOSIS — F90.2 ATTENTION DEFICIT HYPERACTIVITY DISORDER (ADHD), COMBINED TYPE: ICD-10-CM

## 2025-05-21 LAB
ALBUMIN SERPL BCP-MCNC: 5.2 G/DL (ref 3.4–5)
ALP SERPL-CCNC: 46 U/L (ref 33–110)
ALT SERPL W P-5'-P-CCNC: 18 U/L (ref 7–45)
ANION GAP SERPL CALC-SCNC: 12 MMOL/L (ref 10–20)
AST SERPL W P-5'-P-CCNC: 12 U/L (ref 9–39)
BASOPHILS # BLD AUTO: 0.02 X10*3/UL (ref 0–0.1)
BASOPHILS NFR BLD AUTO: 0.2 %
BILIRUB DIRECT SERPL-MCNC: 0.1 MG/DL (ref 0–0.3)
BILIRUB SERPL-MCNC: 0.8 MG/DL (ref 0–1.2)
BUN SERPL-MCNC: 12 MG/DL (ref 6–23)
CALCIUM SERPL-MCNC: 10.1 MG/DL (ref 8.6–10.6)
CHLORIDE SERPL-SCNC: 101 MMOL/L (ref 98–107)
CO2 SERPL-SCNC: 26 MMOL/L (ref 21–32)
CREAT SERPL-MCNC: 0.8 MG/DL (ref 0.5–1.05)
EGFRCR SERPLBLD CKD-EPI 2021: >90 ML/MIN/1.73M*2
EOSINOPHIL # BLD AUTO: 0.05 X10*3/UL (ref 0–0.7)
EOSINOPHIL NFR BLD AUTO: 0.6 %
ERYTHROCYTE [DISTWIDTH] IN BLOOD BY AUTOMATED COUNT: 13 % (ref 11.5–14.5)
FERRITIN SERPL-MCNC: 1189 NG/ML (ref 8–150)
GLUCOSE SERPL-MCNC: 96 MG/DL (ref 74–99)
HCT VFR BLD AUTO: 42.6 % (ref 36–46)
HGB BLD-MCNC: 13.7 G/DL (ref 12–16)
IMM GRANULOCYTES # BLD AUTO: 0.05 X10*3/UL (ref 0–0.7)
IMM GRANULOCYTES NFR BLD AUTO: 0.6 % (ref 0–0.9)
LYMPHOCYTES # BLD AUTO: 2.57 X10*3/UL (ref 1.2–4.8)
LYMPHOCYTES NFR BLD AUTO: 31.3 %
MCH RBC QN AUTO: 27.1 PG (ref 26–34)
MCHC RBC AUTO-ENTMCNC: 32.2 G/DL (ref 32–36)
MCV RBC AUTO: 84 FL (ref 80–100)
MONOCYTES # BLD AUTO: 0.48 X10*3/UL (ref 0.1–1)
MONOCYTES NFR BLD AUTO: 5.8 %
NEUTROPHILS # BLD AUTO: 5.04 X10*3/UL (ref 1.2–7.7)
NEUTROPHILS NFR BLD AUTO: 61.5 %
NRBC BLD-RTO: 0 /100 WBCS (ref 0–0)
PHOSPHATE SERPL-MCNC: 4.3 MG/DL (ref 2.5–4.9)
PLATELET # BLD AUTO: 261 X10*3/UL (ref 150–450)
POTASSIUM SERPL-SCNC: 4.3 MMOL/L (ref 3.5–5.3)
PROT SERPL-MCNC: 7.7 G/DL (ref 6.4–8.2)
RBC # BLD AUTO: 5.06 X10*6/UL (ref 4–5.2)
SODIUM SERPL-SCNC: 135 MMOL/L (ref 136–145)
WBC # BLD AUTO: 8.2 X10*3/UL (ref 4.4–11.3)

## 2025-05-21 PROCEDURE — 99213 OFFICE O/P EST LOW 20 MIN: CPT | Performed by: NURSE PRACTITIONER

## 2025-05-21 PROCEDURE — 80048 BASIC METABOLIC PNL TOTAL CA: CPT | Performed by: PEDIATRICS

## 2025-05-21 PROCEDURE — 82728 ASSAY OF FERRITIN: CPT | Performed by: PEDIATRICS

## 2025-05-21 PROCEDURE — 99215 OFFICE O/P EST HI 40 MIN: CPT | Performed by: PEDIATRICS

## 2025-05-21 PROCEDURE — 84075 ASSAY ALKALINE PHOSPHATASE: CPT | Performed by: PEDIATRICS

## 2025-05-21 PROCEDURE — 85025 COMPLETE CBC W/AUTO DIFF WBC: CPT | Performed by: PEDIATRICS

## 2025-05-21 PROCEDURE — 36415 COLL VENOUS BLD VENIPUNCTURE: CPT | Performed by: PEDIATRICS

## 2025-05-21 PROCEDURE — 84100 ASSAY OF PHOSPHORUS: CPT | Performed by: PEDIATRICS

## 2025-05-21 ASSESSMENT — PAIN SCALES - GENERAL: PAINLEVEL_OUTOF10: 0-NO PAIN

## 2025-05-21 NOTE — PROGRESS NOTES
Massage Therapy / Acupuncture Note:  I visited briefly with Alique and Mom today.  They stated there were doing well.  I will continue to check in.

## 2025-05-21 NOTE — PROGRESS NOTES
SW met with pt and pt's mom during clinic visit per request by Mag. Pt asked questions about assistance with paying her medical bills. SW will contact financial counselor, Sonja to help with this. SW will also look into organizations who may be able to assist.     SW emailed pt resources she found that may be able to assist.     Melinda BALES, KIKI  Social Work  Pediatric Hematology/Oncology  [id number]

## 2025-05-21 NOTE — PROGRESS NOTES
Patient ID: Jaylan Palmer is a 29 y.o. female.  Referring Physician: Christian Turner, APRN-CNP  [address] [place] [address]  Primary Care Provider: Jazmine Howell MD    Date of Service:  5/21/2025    SUBJECTIVE:    History of Present Illness:  Jaylan Palmer is a 29 y.o. female who was referred by Christian Turner, * and presents with her mother.          Jaylan is here for her survivorship follow up.  Overall doing well.  , living in [place].  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 Still with elevated temperatures on a chronic basis.  Average temperature is about 100.4.  Also has brain fog/chemo brain and fatigue.  Sometimes gets body aches on the days that she is getting fatigue and brain fog.   Headaches are improved overall.  Abdominal discomfort is also improved.  Taking wegovy which is tolerating well and feels like it is helping her IBS symptoms.            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 She was apparently well for nearly 22 years until November of 2020 when she started complaining of multiple vague symptoms including fatigue, headaches, fainting, dizziness, generalized body pain, tiredness, bruising over the arms, petechiae, low grade fevers and heavy menses. CBC showed thrombocytopenia and peripheral circulating blasts concerning for relapse. She subsequently moved here for further treatment    Her exam showed multiple bruises over thighs, legs, arms and petechiae. 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 Lab work showed: CBC 12.9>9.6/30.9<26  ANC 3350  ALC 3230 Blast %44    UA 3.8  RFP: 139/3.7/107/23/15/0.57 Peripheral smear: circulating blasts with Agueda rods consistent with  relapsed AML    BMA/Bx done which showed hypercellular marrow involved by 57% blasts consistent with AML.   Cytogenetics, FISH - negative for CBFB (16q22 rearrangement), KMT2A MLL gene rearrangement, p53, RUNX1, 7q- and 5q-  and molecular studies are unremarkable - FLT3 ITD and FLT3 TKD negative. 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 CSF- negative for blasts    Based on the above Jaylan did not have any of the high risk features. 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 We discussed her case at the time in both Adult and Pediatric tumor boards. She was offered to go on Phase III trial with uproleselan administered with chemo vs chemo alone in relapsed/refractory AML. However after careful consideration, Jaylan declined the trial  Subsequently proceeded with idarubicin-FLAG regimen.  She had an ECHO, which showed normal EF69% normal cardiac function.  She has prior cum anthracycline dose of 340 mg/m2.  Dose reduced cecy from 10mg/m2 to 8mg/m2 and administered zinecard. She received her first course chemotherapy and tolerated it fairly well. No significant tumor lysis. Cleared peripheral blasts early by day 3 of chemo. Fairly uncomplicated course except for Enterococcus Faecalis bacteremia and subsequent line removal.   End of induction BMA/ BX done on 4/23 showed hypocellular bone marrow (40% cellular) with maturing trilineage hematopoiesis, left shifted granulopoiesis and 3% myeloblasts. The overall findings suggest a regenerating marrow. The myeloblasts do not show immunophenotypic atypia seen in the patient's leukemic blasts (aberrant CD7 expression) .  Of note, after obtaining her NGS panel results we reviewed her previous reports her initial disease at age 2 was negative for CEBPA and it was tested as she was part of the COG trial. We performed a skin bx to assess for germline CEBPA and results are  pending.  Her unrelated bone marrow donor search has shown a suitable match yet. Mom is age 65 and Dad is 78, mom 6/12 dad 8/12 match. One of her half sisters (Rosaura age 46y)  is 7/12 match. Jaylan declined HSCT after discussion with BMT team regarding TRM.  They had also seeked outside opinions which also supported chemotherapy rather than HSCT.    5/5/21 - 5/7/21: HD ARAC, Neupogen 5/9-5/18 6/2/21- admission for HD ARAC/ GO    7/19/21: Admit for Cycle 3 HD ARAC (last cycle of chemo), discharged home on 8/9.     10/15/21: started maintenance chemotherapy with oral Azacitidine          Acute myeloid leukemia (Multi)   9/19/2023 Initial Diagnosis    Acute myeloid leukemia (CMS/HCC)         Past Medical History: Jaylan has a past medical history of Acute myeloblastic leukemia, not having achieved remission (Multi) (11/03/2021), Personal history of other diseases of the circulatory system, Personal history of other diseases of the digestive system, Personal history of other diseases of the female genital tract (01/11/2015), Personal history of other diseases of the nervous system and sense organs, Personal history of other diseases of urinary system, Personal history of other mental and behavioral disorders, Personal history of other specified conditions (01/11/2015), and Polyp of corpus uteri.    Surgical History:  Jaylan has a past surgical history that includes Tonsillectomy (01/08/2015) and Other surgical history (01/08/2015).    Social History:  Jaylan reports that she has never smoked. She has never used smokeless tobacco. She reports current alcohol use of about 2.0 standard drinks of alcohol per week. She reports that she does not use drugs.    Family History:    Family History   Problem Relation Name Age of Onset    No Known Problems Mother      Coronary artery disease Father      Hypertension Father      Diabetes Maternal Grandmother      Coronary artery disease Maternal Grandfather      Hypertension  "Maternal Grandfather      Asthma Other         Review of Systems   Constitutional:  Positive for fever.   HENT:  Negative.     Eyes: Negative.    Respiratory: Negative.     Cardiovascular: Negative.    Gastrointestinal: Negative.         Abdominal discomfort improved   Genitourinary: Negative.          Normal periods   Musculoskeletal: Negative.    Skin: Negative.    Neurological: Negative.    Hematological: Negative.    Psychiatric/Behavioral: Negative.         OBJECTIVE:    VS:  /87 (BP Location: Right arm, Patient Position: Sitting, BP Cuff Size: Adult)   Pulse 90   Temp 36.7 °C (98.1 °F) (Tympanic)   Resp 20   Ht 1.698 m (5' 6.85\")   Wt 91.4 kg (201 lb 8 oz)   BMI 31.70 kg/m²   BSA: 2.08 meters squared  Pain:       Physical Exam  Constitutional:       Appearance: Normal appearance.   HENT:      Head: Normocephalic.      Right Ear: External ear normal.      Left Ear: External ear normal.      Nose: Nose normal.      Mouth/Throat:      Mouth: Mucous membranes are moist.      Pharynx: Oropharynx is clear.   Eyes:      Conjunctiva/sclera: Conjunctivae normal.      Pupils: Pupils are equal, round, and reactive to light.   Cardiovascular:      Rate and Rhythm: Normal rate.      Pulses: Normal pulses.      Heart sounds: Normal heart sounds.   Pulmonary:      Effort: Pulmonary effort is normal.      Breath sounds: Normal breath sounds.   Abdominal:      General: Abdomen is flat.      Palpations: Abdomen is soft.   Musculoskeletal:         General: Normal range of motion.      Cervical back: Normal range of motion.   Skin:     General: Skin is warm.   Neurological:      General: No focal deficit present.      Mental Status: She is alert.   Psychiatric:         Mood and Affect: Mood normal.           Laboratory:  The pertinent laboratory results were reviewed and discussed with the patient.  Notably, Last CBC w. Diff.:    Hospital Outpatient Visit on 05/21/2025   Component Date Value Ref Range Status    " Albumin 05/21/2025 5.2 (H)  3.4 - 5.0 g/dL Final    Bilirubin, Total 05/21/2025 0.8  0.0 - 1.2 mg/dL Final    Bilirubin, Direct 05/21/2025 0.1  0.0 - 0.3 mg/dL Final    Alkaline Phosphatase 05/21/2025 46  33 - 110 U/L Final    ALT 05/21/2025 18  7 - 45 U/L Final    Patients treated with Sulfasalazine may generate falsely decreased results for ALT.    AST 05/21/2025 12  9 - 39 U/L Final    Total Protein 05/21/2025 7.7  6.4 - 8.2 g/dL Final    WBC 05/21/2025 8.2  4.4 - 11.3 x10*3/uL Final    nRBC 05/21/2025 0.0  0.0 - 0.0 /100 WBCs Final    RBC 05/21/2025 5.06  4.00 - 5.20 x10*6/uL Final    Hemoglobin 05/21/2025 13.7  12.0 - 16.0 g/dL Final    Hematocrit 05/21/2025 42.6  36.0 - 46.0 % Final    MCV 05/21/2025 84  80 - 100 fL Final    MCH 05/21/2025 27.1  26.0 - 34.0 pg Final    MCHC 05/21/2025 32.2  32.0 - 36.0 g/dL Final    RDW 05/21/2025 13.0  11.5 - 14.5 % Final    Platelets 05/21/2025 261  150 - 450 x10*3/uL Final    Neutrophils % 05/21/2025 61.5  40.0 - 80.0 % Final    Immature Granulocytes %, Automated 05/21/2025 0.6  0.0 - 0.9 % Final    Immature Granulocyte Count (IG) includes promyelocytes, myelocytes and metamyelocytes but does not include bands. Percent differential counts (%) should be interpreted in the context of the absolute cell counts (cells/UL).    Lymphocytes % 05/21/2025 31.3  13.0 - 44.0 % Final    Monocytes % 05/21/2025 5.8  2.0 - 10.0 % Final    Eosinophils % 05/21/2025 0.6  0.0 - 6.0 % Final    Basophils % 05/21/2025 0.2  0.0 - 2.0 % Final    Neutrophils Absolute 05/21/2025 5.04  1.20 - 7.70 x10*3/uL Final    Percent differential counts (%) should be interpreted in the context of the absolute cell counts (cells/uL).    Immature Granulocytes Absolute, Au* 05/21/2025 0.05  0.00 - 0.70 x10*3/uL Final    Lymphocytes Absolute 05/21/2025 2.57  1.20 - 4.80 x10*3/uL Final    Monocytes Absolute 05/21/2025 0.48  0.10 - 1.00 x10*3/uL Final    Eosinophils Absolute 05/21/2025 0.05  0.00 - 0.70 x10*3/uL Final     Basophils Absolute 05/21/2025 0.02  0.00 - 0.10 x10*3/uL Final    Ferritin 05/21/2025 1,189 (H)  8 - 150 ng/mL Final    Glucose 05/21/2025 96  74 - 99 mg/dL Final    Sodium 05/21/2025 135 (L)  136 - 145 mmol/L Final    Potassium 05/21/2025 4.3  3.5 - 5.3 mmol/L Final    Chloride 05/21/2025 101  98 - 107 mmol/L Final    Bicarbonate 05/21/2025 26  21 - 32 mmol/L Final    Anion Gap 05/21/2025 12  10 - 20 mmol/L Final    Urea Nitrogen 05/21/2025 12  6 - 23 mg/dL Final    Creatinine 05/21/2025 0.80  0.50 - 1.05 mg/dL Final    eGFR 05/21/2025 >90  >60 mL/min/1.73m*2 Final    Calculations of estimated GFR are performed using the 2021 CKD-EPI Study Refit equation without the race variable for the IDMS-Traceable creatinine methods.  https://jasn.asnjournals.org/content/early/2021/09/22/ASN.9562415720    Calcium 05/21/2025 10.1  8.6 - 10.6 mg/dL Final    Phosphorus 05/21/2025 4.3  2.5 - 4.9 mg/dL Final      Pathology:  The pertinent pathology results were reviewed and discussed with the patient.  .    Imaging:  [facility] CT Abdomen Pelvis w IV contrast  The pertinent imaging results were reviewed and discussed with the patient.  Notably, No bowel or urinary tract obstruction. No free air or free fluid.     Common mesenteric lymph nodes, nonspecific. Consider mesenteric adenitis.     Cholelithiasis.     Nonobstructing right nephrolithiasis. .  Chest Xray 11/208/2024  Findings: There is no evidence for active cardiovascular or pulmonary disease.   The heart size is within normal limits and the lung fields are clear.   Results for orders placed in visit on 05/03/21        Result Date: 3/14/2024  1 1 [facility] [address] [place] [address] [phone] (fax) Echocardiogram-[facility] Name: ALIQUE TOPALIAN Study Date: 03/14/2024 01:00 PM MRN: [id number] Account #: [id number] B/P: 135 mmHg/76 mmHg HR: 97 bpm YOB: 1995 Location: [facility] Height: 68 in. Age:  28 year(s) Patient Room: Weight: 205 lb. Gender: Female Patient Status: OutPt BSA: 2.07 m2 Indication: Fever of unknown origin and history of anthracycline exposure Examination: Echocardiogram (Complete) Image Quality: Good Patient Consent: Procedure explained to patient Conclusions Left Ventricle: The left ventricle is mildly enlarged. Global left ventricular systolic function is normal. The EF is 60 % visually. Left ventricular wall thickness is mildly increased. No regional wall motion abnormality. Normal diastolic function. Concentric left ventricular hypertrophy. Right Ventricle: The right ventricle is normal in size. Normal right ventricular systolic function. Unable to assess right sided pressures due to lack of measurable tricuspid regurgitation. Left Atrium: The left atrium is at upper normal limits in size. Overall Conclusions: No significant valvular abnormalities Measurements Left Ventricle Label Value Normal Value LVOTd 2.1 cm (18cm - 20cm) LVOT VTI 21.3 cm (18cm - 22cm) LVOT PGmax 7 mmHg LVEF visual 60 % LVDd, 2D 4.5 cm (3.9cm - 5.3cm) LVDs, 2D 2.88 cm (2.1cm - 4cm) IVSd, 2D 1.29 cm (0.6cm - 1.1cm) LVPWd, 2D 1.26 cm (0.6cm - 0.9cm) LV Mass, 2D .32 g LV Mass Index, 2D .5 g/m?? (44g/m?? - 88.4g/m??) RWT, MM 0.56 (0 - 0.42) LVSVI, 2D 29.5 ml/m2 LVOT PGmean 3 mmHg LVSV_LVOT 74 ml Right Ventricle Label Value Normal Value TAPSE 2.61 cm Left Atrium Label Value Normal Value LA Volume, BP 52 ml (22ml - 52ml) LAESV index, BP 25.1 ml/m?? Right Atrium Label Value Normal Value RA Area 10.7 cm?? Aortic Valve Label Value Normal Value AV DVI 1.02 AV VTI 22.6 cm Mitral Valve Label Value Normal Value MV E Vmax 0.74 m/s MV A Vmax 0.9 m/s MV E/A 0.82 MV E/E' lateral 4.7 MV E' lateral 0.16 m/s Tricuspid Valve Label Value Normal Value RA Pressure 3 mmHg Aorta Label Value Normal Value AoRoot, 2D 3.1 cm (1.4cm - 3.8cm) Valvular Assessment LVOT 0.7 - 1.1 m/sec Aortic Valve 1.0 - 1.7 m/sec Mitral Valve 0.6 - 1.3  m/sec Tricuspid Valve 0.3 - 0.7 m/sec Pulmonic Valve 0.6 - 0.9 m/sec Regurgitation No No Trivial Trivial Stenosis No No No No Max Velocity 1.28 m/sec 1.25 m/s 0.74 m/sec 1.20 m/s Max Gradient 6.00 mmHg 6.00 mmHg Mean Gradient 4.00 mmHg Valve Area 3.5 cm?? Findings Left Ventricle: The left ventricle is mildly enlarged. Global left ventricular systolic function is normal. The EF is 60 % visually. Left ventricular wall thickness is mildly increased. No regional wall motion abnormality. Normal diastolic function. Concentric left ventricular hypertrophy. Right Ventricle: The right ventricle is normal in size. Normal right ventricular systolic function. Unable to assess right sided pressures due to lack of measurable tricuspid regurgitation. Left Atrium: The left atrium is at upper normal limits in size. Right Atrium: The right atrium is normal in size. Mitral Valve: The mitral valve is normal in mobility and thickness. No mitral regurgitation. No mitral valve stenosis. Aortic Valve: The aortic valve is normal. No aortic valve regurgitation. No aortic valve stenosis. Tricuspid Valve: Normal tricuspid valve. Trivial tricuspid regurgitation. No tricuspid valve stenosis. Pulmonic Valve: Normal pulmonary valve. Trivial pulmonary regurgitation. No pulmonic valve stenosis. Aorta: The aortic root is normal in size. Great Vessels: IVC: The IVC is normal in size. There is inspiratory collapse of the IVC. Pericardium: No pericardial effusion. Procedure Staff Reading Group: [facility] Sonographer: Shanthi Weeks  Ordering Physician: LELE OSBORNE Electronically signed by MD Nacho Doyle on 03/14/2024 at 04:25 PM       ASSESSMENT and PLAN:  Jaylan, 30 yo with PMH of AML at age 2,with history of denovo v/s relapsed AML with favorable cytogenetics (FLT3 ITD/TKD negative, biallelic CEBPA and GATA2 mutation). She is s/p cycle 1 of FLAG-HIRO (3/27), and bone marrow biopsy on 4/23 at the end of cycle 1 of chemotherapy shows  morphological remission and no evidence of MRD based on flow. She is  s/p 3 cycles of consolidation chemo with HD ARAC (one cycle included one dose of gemtuxumab).  She was on maintenance azacitadine which she stopped taking in January 2024.  She has been worked up at  for elevated ferritin and mild increase LIC on liver MRI as well as new liver lesion seen on imaging not tender, LFTs normal. Most likely etiology for these changes is thought to be hepatic steatosis.  She is off of the OCPs due to the liver lesion.   She has had persistent fevers most recent average temperature is 101.     Etiology of these fevers in not clear yet despite intensive work up that included bone marrow aspirate, ID, immunologic, and rheumatologic work up. Despite these fevers, she has managed to function quite well, she is active with working.      Plan:    Oncology:   - 29 year old female with relapsed AML s/p cycle 1 FLAG-HIRO in March 2021 and x1 cycle of HD-ARAC and 1x HD Arac + GO. Bone marrow biopsy on 4/23 showed morpholoical remission and no evidence of MRD based on flow. Jaylan is  s/p 3 cycles of Consolidation chemo with HD ARAC (3gm/m2/dose). -Jaylan spoke with Dr. Baum in adult Heme/Onc re: vaccine trial. Not eligible for trial currently.She completed maintenance azacitadine in Jan 2024.  She stopped early due to fevers and side effects.    - Did not undergo BMT given higher risk for morbidity due to underlying medical issues (please refer to note from Dr Rodríguez BMT physician)  -Jaylan had Bone marrow bx and aspirate done on 8/13/2024. Results of BMBX were negative for MRD        Pulmonary:   Previously had reduced PFT results, currently arranging f/u appointment with Pulmonary Service. Repeat PFT scheduled for 10/4/24, met with Pulmonology. Improved DLCO currently considered in the normal range.     Cardiac: She is at risk for cardiac toxicity due to anthracycline history.   - Follows with onco-cardiology at . Last   echo was 3/24 and showed EF 60%. Continue metoprolol.       Dental:  Recommend exam and cleaning every 6 mos    GI:  At risk for hepatic steatosis due to history of chemotherapy.  Continue to follow hepatic function tests.  Following with hepatology due to liver lesion which has been felt to be due to hepatic steatosis.     FENGI:  -Jaylan has a history of multiple GI complaints/ IBS. They are improved on Wegovy.         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 Continues to see optho.  Also should have cataract screening left eye.     Psychosocial:  At risk for psychosocial issues due to therapy.  Had routine distress screening from Mag Tierney, psych np today.     SMN:  at risk for therapy related myeloid malignancy.  Screen for signs and symptoms of myeloid malignancy.  Continue to follow CBC.   At risk for breast cancer due to anthracycline. Long term follow up guidelines does not give recommendations yet for frequency of mammogram in patients who have had anthracyine.  Has had mammogram 1/24/23.     Discussed that late effect screening for gemtuzimab and azacitadine are not listed in PFC at this point.      I gave Jaylan her PFC and helped her get on her online PFC account today so that she has access to her treatment summary and off therapy care recommendations    Jaylan has PCP in [place] and also in [place].      RTC: Will come back for follow up with us in 3 mos for surviourship review          Jazmine Howell MD

## 2025-05-21 NOTE — PROGRESS NOTES
"Jaylan, 30 yo with PMH of AML at age 2,with history of denovo v/s relapsed AML with favorable cytogenetics (FLT3 ITD/TKD negative, biallelic CEBPA and GATA2 mutation). She is s/p cycle 1 of FLAG-HIRO (3/27), and bone marrow biopsy on 4/23 at the end of cycle 1 of chemotherapy shows morphological remission and no evidence of MRD based on flow. She is now s/p 3 cycles of consolidation chemo with HD ARAC (one cycle included one dose of gemtuxumab). She presents today in clinic for her annual survivorship visit, she is accompanied by her mother. Informed consent was obtained for this provider to administer the PROMIS short form 8a for both anxiety and depression. She scored a 21 for anxiety. Her T-score was 59.4, SE 2.0. Based on the general reference population this score indicates that she may be experiencing mild to moderate anxiety. She scored an 8 for depression 38.2, SE 5.7. Based on the general reference population she is not experiencing depression. 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 She reports that it carries over to other aspects of her life, even her hobbies such as [employer] cancer advocacy. She reports stable mood with work related anxiety. She reports that she \"probably\" needs to set some boundaries with work life balance. We discussed considering consulting a , she indicated interest in doing so. Reviewed [facility], cancer wellness support, as she is interested in learning more about diet and nutrition. Team updated. Thank you for involving me in the care of this patient.  "

## 2025-05-22 ASSESSMENT — ENCOUNTER SYMPTOMS: FEVER: 1

## 2025-05-23 ASSESSMENT — ENCOUNTER SYMPTOMS
PSYCHIATRIC NEGATIVE: 1
MUSCULOSKELETAL NEGATIVE: 1
HEMATOLOGIC/LYMPHATIC NEGATIVE: 1
CARDIOVASCULAR NEGATIVE: 1
EYES NEGATIVE: 1
RESPIRATORY NEGATIVE: 1
GASTROINTESTINAL NEGATIVE: 1
NEUROLOGICAL NEGATIVE: 1

## 2025-08-21 ENCOUNTER — APPOINTMENT (OUTPATIENT)
Dept: PEDIATRIC HEMATOLOGY/ONCOLOGY | Facility: HOSPITAL | Age: 30
End: 2025-08-21
Payer: COMMERCIAL

## 2025-09-03 ENCOUNTER — APPOINTMENT (OUTPATIENT)
Dept: PEDIATRIC HEMATOLOGY/ONCOLOGY | Facility: HOSPITAL | Age: 30
End: 2025-09-03
Payer: COMMERCIAL